# Patient Record
Sex: MALE | Race: WHITE | Employment: OTHER | URBAN - METROPOLITAN AREA
[De-identification: names, ages, dates, MRNs, and addresses within clinical notes are randomized per-mention and may not be internally consistent; named-entity substitution may affect disease eponyms.]

---

## 2018-01-01 ENCOUNTER — APPOINTMENT (OUTPATIENT)
Dept: GENERAL RADIOLOGY | Age: 58
DRG: 136 | End: 2018-01-01
Attending: INTERNAL MEDICINE
Payer: MEDICAID

## 2018-01-01 ENCOUNTER — HOSPITAL ENCOUNTER (EMERGENCY)
Age: 58
Discharge: HOME | End: 2018-10-24
Attending: EMERGENCY MEDICINE
Payer: MEDICAID

## 2018-01-01 ENCOUNTER — APPOINTMENT (OUTPATIENT)
Dept: GENERAL RADIOLOGY | Age: 58
End: 2018-01-01
Payer: MEDICAID

## 2018-01-01 ENCOUNTER — CLINICAL DOCUMENTATION (OUTPATIENT)
Dept: RADIATION ONCOLOGY | Age: 58
End: 2018-01-01

## 2018-01-01 ENCOUNTER — HOSPITAL ENCOUNTER (INPATIENT)
Age: 58
LOS: 7 days | DRG: 136 | End: 2018-12-13
Attending: INTERNAL MEDICINE | Admitting: INTERNAL MEDICINE
Payer: MEDICAID

## 2018-01-01 ENCOUNTER — APPOINTMENT (OUTPATIENT)
Dept: CT IMAGING | Age: 58
DRG: 136 | End: 2018-01-01
Attending: INTERNAL MEDICINE
Payer: MEDICAID

## 2018-01-01 ENCOUNTER — APPOINTMENT (OUTPATIENT)
Dept: CT IMAGING | Age: 58
End: 2018-01-01
Payer: MEDICAID

## 2018-01-01 ENCOUNTER — HOSPITAL ENCOUNTER (EMERGENCY)
Age: 58
Discharge: OTHER INSTITUTION | End: 2018-12-05
Attending: EMERGENCY MEDICINE | Admitting: INTERNAL MEDICINE
Payer: MEDICAID

## 2018-01-01 VITALS
HEIGHT: 73 IN | TEMPERATURE: 98 F | HEART RATE: 108 BPM | DIASTOLIC BLOOD PRESSURE: 76 MMHG | SYSTOLIC BLOOD PRESSURE: 148 MMHG | OXYGEN SATURATION: 95 % | BODY MASS INDEX: 23.86 KG/M2 | WEIGHT: 180 LBS | RESPIRATION RATE: 18 BRPM

## 2018-01-01 VITALS
HEIGHT: 73 IN | SYSTOLIC BLOOD PRESSURE: 126 MMHG | WEIGHT: 169.53 LBS | RESPIRATION RATE: 36 BRPM | DIASTOLIC BLOOD PRESSURE: 87 MMHG | TEMPERATURE: 99 F | BODY MASS INDEX: 22.47 KG/M2 | HEART RATE: 157 BPM | OXYGEN SATURATION: 65 %

## 2018-01-01 VITALS
RESPIRATION RATE: 16 BRPM | SYSTOLIC BLOOD PRESSURE: 108 MMHG | TEMPERATURE: 98.1 F | BODY MASS INDEX: 21.2 KG/M2 | HEART RATE: 91 BPM | OXYGEN SATURATION: 99 % | DIASTOLIC BLOOD PRESSURE: 70 MMHG | HEIGHT: 73 IN | WEIGHT: 160 LBS

## 2018-01-01 DIAGNOSIS — I47.20 V TACH: ICD-10-CM

## 2018-01-01 DIAGNOSIS — C34.90 PRIMARY MALIGNANT NEOPLASM OF LUNG METASTATIC TO OTHER SITE, UNSPECIFIED LATERALITY (HCC): ICD-10-CM

## 2018-01-01 DIAGNOSIS — R53.1 GENERAL WEAKNESS: ICD-10-CM

## 2018-01-01 DIAGNOSIS — R53.83 FATIGUE, UNSPECIFIED TYPE: Primary | ICD-10-CM

## 2018-01-01 DIAGNOSIS — J18.9 PNEUMONIA DUE TO ORGANISM: ICD-10-CM

## 2018-01-01 DIAGNOSIS — J18.9 PNEUMONIA, UNSPECIFIED ORGANISM: ICD-10-CM

## 2018-01-01 DIAGNOSIS — C34.90 MALIGNANT NEOPLASM OF LUNG, UNSPECIFIED LATERALITY, UNSPECIFIED PART OF LUNG (HCC): Primary | ICD-10-CM

## 2018-01-01 DIAGNOSIS — R09.02 HYPOXIA: ICD-10-CM

## 2018-01-01 LAB
A/G RATIO: 1 (ref 1.1–2.2)
A/G RATIO: 1.2 (ref 1.1–2.2)
ALBUMIN SERPL-MCNC: 3.9 G/DL (ref 3.4–5)
ALBUMIN SERPL-MCNC: 4.3 G/DL (ref 3.4–5)
ALP BLD-CCNC: 105 U/L (ref 40–129)
ALP BLD-CCNC: 90 U/L (ref 40–129)
ALT SERPL-CCNC: 19 U/L (ref 10–40)
ALT SERPL-CCNC: 25 U/L (ref 10–40)
ANION GAP SERPL CALCULATED.3IONS-SCNC: 10 MMOL/L (ref 3–16)
ANION GAP SERPL CALCULATED.3IONS-SCNC: 10 MMOL/L (ref 3–16)
ANION GAP SERPL CALCULATED.3IONS-SCNC: 12 MMOL/L (ref 3–16)
ANION GAP SERPL CALCULATED.3IONS-SCNC: 13 MMOL/L (ref 3–16)
ANION GAP SERPL CALCULATED.3IONS-SCNC: 16 MMOL/L (ref 3–16)
ANION GAP SERPL CALCULATED.3IONS-SCNC: 18 MMOL/L (ref 3–16)
ANION GAP SERPL CALCULATED.3IONS-SCNC: 9 MMOL/L (ref 3–16)
APPEARANCE BAL (LAVAGE): ABNORMAL
APTT: 31.3 SEC (ref 26–36)
AST SERPL-CCNC: 26 U/L (ref 15–37)
AST SERPL-CCNC: 38 U/L (ref 15–37)
BASE EXCESS ARTERIAL: -0.1 MMOL/L (ref -3–3)
BASE EXCESS ARTERIAL: -0.2 MMOL/L (ref -3–3)
BASE EXCESS ARTERIAL: -3.8 MMOL/L (ref -3–3)
BASE EXCESS ARTERIAL: -4.2 MMOL/L (ref -3–3)
BASE EXCESS ARTERIAL: -4.5 MMOL/L (ref -3–3)
BASE EXCESS ARTERIAL: -4.7 MMOL/L (ref -3–3)
BASOPHILS ABSOLUTE: 0 K/UL (ref 0–0.2)
BASOPHILS ABSOLUTE: 0.1 K/UL (ref 0–0.2)
BASOPHILS RELATIVE PERCENT: 0 %
BASOPHILS RELATIVE PERCENT: 0.1 %
BASOPHILS RELATIVE PERCENT: 0.2 %
BASOPHILS RELATIVE PERCENT: 0.4 %
BASOPHILS RELATIVE PERCENT: 0.6 %
BILIRUB SERPL-MCNC: 0.4 MG/DL (ref 0–1)
BILIRUB SERPL-MCNC: 0.4 MG/DL (ref 0–1)
BLOOD CULTURE, ROUTINE: NORMAL
BUN BLDV-MCNC: 15 MG/DL (ref 7–20)
BUN BLDV-MCNC: 33 MG/DL (ref 7–20)
BUN BLDV-MCNC: 33 MG/DL (ref 7–20)
BUN BLDV-MCNC: 35 MG/DL (ref 7–20)
BUN BLDV-MCNC: 49 MG/DL (ref 7–20)
BUN BLDV-MCNC: 53 MG/DL (ref 7–20)
BUN BLDV-MCNC: 55 MG/DL (ref 7–20)
CALCIUM SERPL-MCNC: 10 MG/DL (ref 8.3–10.6)
CALCIUM SERPL-MCNC: 10 MG/DL (ref 8.3–10.6)
CALCIUM SERPL-MCNC: 10.1 MG/DL (ref 8.3–10.6)
CALCIUM SERPL-MCNC: 12.1 MG/DL (ref 8.3–10.6)
CALCIUM SERPL-MCNC: 9 MG/DL (ref 8.3–10.6)
CALCIUM SERPL-MCNC: 9 MG/DL (ref 8.3–10.6)
CALCIUM SERPL-MCNC: 9.3 MG/DL (ref 8.3–10.6)
CARBOXYHEMOGLOBIN ARTERIAL: 0.5 % (ref 0–1.5)
CARBOXYHEMOGLOBIN ARTERIAL: 0.8 % (ref 0–1.5)
CARBOXYHEMOGLOBIN ARTERIAL: 0.8 % (ref 0–1.5)
CARBOXYHEMOGLOBIN ARTERIAL: 0.9 % (ref 0–1.5)
CARBOXYHEMOGLOBIN ARTERIAL: 1.2 % (ref 0–1.5)
CARBOXYHEMOGLOBIN ARTERIAL: 1.5 % (ref 0–1.5)
CHLORIDE BLD-SCNC: 102 MMOL/L (ref 99–110)
CHLORIDE BLD-SCNC: 106 MMOL/L (ref 99–110)
CHLORIDE BLD-SCNC: 108 MMOL/L (ref 99–110)
CHLORIDE BLD-SCNC: 93 MMOL/L (ref 99–110)
CHLORIDE BLD-SCNC: 95 MMOL/L (ref 99–110)
CHLORIDE BLD-SCNC: 97 MMOL/L (ref 99–110)
CHLORIDE BLD-SCNC: 99 MMOL/L (ref 99–110)
CLOT EVALUATION BAL: ABNORMAL
CO2: 21 MMOL/L (ref 21–32)
CO2: 21 MMOL/L (ref 21–32)
CO2: 23 MMOL/L (ref 21–32)
CO2: 25 MMOL/L (ref 21–32)
CO2: 26 MMOL/L (ref 21–32)
CO2: 27 MMOL/L (ref 21–32)
CO2: 28 MMOL/L (ref 21–32)
COLOR LAVAGE: COLORLESS
CREAT SERPL-MCNC: 0.7 MG/DL (ref 0.8–1.3)
CREAT SERPL-MCNC: 0.8 MG/DL (ref 0.8–1.3)
CREAT SERPL-MCNC: 1 MG/DL (ref 0.8–1.3)
CREAT SERPL-MCNC: 1 MG/DL (ref 0.8–1.3)
CULTURE, BLOOD 2: NORMAL
CULTURE, RESPIRATORY: NORMAL
CULTURE, RESPIRATORY: NORMAL
EKG ATRIAL RATE: 101 BPM
EKG ATRIAL RATE: 116 BPM
EKG ATRIAL RATE: 121 BPM
EKG ATRIAL RATE: 174 BPM
EKG DIAGNOSIS: NORMAL
EKG P AXIS: 73 DEGREES
EKG P AXIS: 73 DEGREES
EKG P-R INTERVAL: 136 MS
EKG P-R INTERVAL: 136 MS
EKG Q-T INTERVAL: 262 MS
EKG Q-T INTERVAL: 304 MS
EKG Q-T INTERVAL: 306 MS
EKG Q-T INTERVAL: 314 MS
EKG QRS DURATION: 70 MS
EKG QRS DURATION: 74 MS
EKG QRS DURATION: 80 MS
EKG QRS DURATION: 90 MS
EKG QTC CALCULATION (BAZETT): 418 MS
EKG QTC CALCULATION (BAZETT): 431 MS
EKG QTC CALCULATION (BAZETT): 436 MS
EKG QTC CALCULATION (BAZETT): 500 MS
EKG R AXIS: 10 DEGREES
EKG R AXIS: 46 DEGREES
EKG R AXIS: 6 DEGREES
EKG R AXIS: 73 DEGREES
EKG T AXIS: 107 DEGREES
EKG T AXIS: 51 DEGREES
EKG T AXIS: 71 DEGREES
EKG T AXIS: 72 DEGREES
EKG VENTRICULAR RATE: 116 BPM
EKG VENTRICULAR RATE: 121 BPM
EKG VENTRICULAR RATE: 153 BPM
EKG VENTRICULAR RATE: 161 BPM
EOSINOPHILS ABSOLUTE: 0 K/UL (ref 0–0.6)
EOSINOPHILS ABSOLUTE: 0.2 K/UL (ref 0–0.6)
EOSINOPHILS RELATIVE PERCENT: 0 %
EOSINOPHILS RELATIVE PERCENT: 0.5 %
EOSINOPHILS RELATIVE PERCENT: 2 %
FINAL REPORT: NORMAL
GFR AFRICAN AMERICAN: >60
GFR NON-AFRICAN AMERICAN: >60
GLOBULIN: 3.7 G/DL
GLOBULIN: 4 G/DL
GLUCOSE BLD-MCNC: 106 MG/DL (ref 70–99)
GLUCOSE BLD-MCNC: 120 MG/DL (ref 70–99)
GLUCOSE BLD-MCNC: 124 MG/DL (ref 70–99)
GLUCOSE BLD-MCNC: 126 MG/DL (ref 70–99)
GLUCOSE BLD-MCNC: 127 MG/DL (ref 70–99)
GLUCOSE BLD-MCNC: 135 MG/DL (ref 70–99)
GLUCOSE BLD-MCNC: 143 MG/DL (ref 70–99)
GLUCOSE BLD-MCNC: 94 MG/DL (ref 70–99)
GRAM STAIN RESULT: NORMAL
GRAM STAIN RESULT: NORMAL
HCO3 ARTERIAL: 22.4 MMOL/L (ref 21–29)
HCO3 ARTERIAL: 22.8 MMOL/L (ref 21–29)
HCO3 ARTERIAL: 23.4 MMOL/L (ref 21–29)
HCO3 ARTERIAL: 23.6 MMOL/L (ref 21–29)
HCO3 ARTERIAL: 24.3 MMOL/L (ref 21–29)
HCO3 ARTERIAL: 25.7 MMOL/L (ref 21–29)
HCT VFR BLD CALC: 35 % (ref 40.5–52.5)
HCT VFR BLD CALC: 36.2 % (ref 40.5–52.5)
HCT VFR BLD CALC: 37.5 % (ref 40.5–52.5)
HCT VFR BLD CALC: 39.4 % (ref 40.5–52.5)
HCT VFR BLD CALC: 44.1 % (ref 40.5–52.5)
HEMOGLOBIN, ART, EXTENDED: 11.6 G/DL (ref 13.5–17.5)
HEMOGLOBIN, ART, EXTENDED: 12 G/DL (ref 13.5–17.5)
HEMOGLOBIN, ART, EXTENDED: 12.3 G/DL (ref 13.5–17.5)
HEMOGLOBIN, ART, EXTENDED: 12.4 G/DL (ref 13.5–17.5)
HEMOGLOBIN, ART, EXTENDED: 12.4 G/DL (ref 13.5–17.5)
HEMOGLOBIN, ART, EXTENDED: 12.6 G/DL (ref 13.5–17.5)
HEMOGLOBIN: 11.4 G/DL (ref 13.5–17.5)
HEMOGLOBIN: 12 G/DL (ref 13.5–17.5)
HEMOGLOBIN: 12.2 G/DL (ref 13.5–17.5)
HEMOGLOBIN: 13 G/DL (ref 13.5–17.5)
HEMOGLOBIN: 14.3 G/DL (ref 13.5–17.5)
INR BLD: 1.04 (ref 0.86–1.14)
LACTIC ACID, SEPSIS: 0.9 MMOL/L (ref 0.4–1.9)
LACTIC ACID: 2 MMOL/L (ref 0.4–2)
LV EF: 55 %
LVEF MODALITY: NORMAL
LYMPHOCYTES ABSOLUTE: 0.4 K/UL (ref 1–5.1)
LYMPHOCYTES ABSOLUTE: 0.6 K/UL (ref 1–5.1)
LYMPHOCYTES ABSOLUTE: 0.8 K/UL (ref 1–5.1)
LYMPHOCYTES ABSOLUTE: 1.3 K/UL (ref 1–5.1)
LYMPHOCYTES ABSOLUTE: 1.3 K/UL (ref 1–5.1)
LYMPHOCYTES RELATIVE PERCENT: 12.3 %
LYMPHOCYTES RELATIVE PERCENT: 15.4 %
LYMPHOCYTES RELATIVE PERCENT: 16.8 %
LYMPHOCYTES RELATIVE PERCENT: 4.9 %
LYMPHOCYTES RELATIVE PERCENT: 7.5 %
LYMPHOCYTES, BAL: 10 % (ref 5–10)
MAGNESIUM: 2.2 MG/DL (ref 1.8–2.4)
MAGNESIUM: 2.6 MG/DL (ref 1.8–2.4)
MCH RBC QN AUTO: 30.1 PG (ref 26–34)
MCH RBC QN AUTO: 30.5 PG (ref 26–34)
MCH RBC QN AUTO: 30.5 PG (ref 26–34)
MCH RBC QN AUTO: 30.8 PG (ref 26–34)
MCH RBC QN AUTO: 31.1 PG (ref 26–34)
MCHC RBC AUTO-ENTMCNC: 32.4 G/DL (ref 31–36)
MCHC RBC AUTO-ENTMCNC: 32.4 G/DL (ref 31–36)
MCHC RBC AUTO-ENTMCNC: 32.5 G/DL (ref 31–36)
MCHC RBC AUTO-ENTMCNC: 33.1 G/DL (ref 31–36)
MCHC RBC AUTO-ENTMCNC: 33.2 G/DL (ref 31–36)
MCV RBC AUTO: 92 FL (ref 80–100)
MCV RBC AUTO: 92.9 FL (ref 80–100)
MCV RBC AUTO: 94 FL (ref 80–100)
MCV RBC AUTO: 94 FL (ref 80–100)
MCV RBC AUTO: 94.9 FL (ref 80–100)
METHEMOGLOBIN ARTERIAL: 0 %
METHEMOGLOBIN ARTERIAL: 0 %
METHEMOGLOBIN ARTERIAL: 0.2 %
METHEMOGLOBIN ARTERIAL: 0.3 %
MONOCYTES ABSOLUTE: 0.2 K/UL (ref 0–1.3)
MONOCYTES ABSOLUTE: 0.3 K/UL (ref 0–1.3)
MONOCYTES ABSOLUTE: 0.6 K/UL (ref 0–1.3)
MONOCYTES ABSOLUTE: 0.6 K/UL (ref 0–1.3)
MONOCYTES ABSOLUTE: 0.9 K/UL (ref 0–1.3)
MONOCYTES RELATIVE PERCENT: 10.1 %
MONOCYTES RELATIVE PERCENT: 2.4 %
MONOCYTES RELATIVE PERCENT: 3.6 %
MONOCYTES RELATIVE PERCENT: 7.4 %
MONOCYTES RELATIVE PERCENT: 7.5 %
NEUTROPHILS ABSOLUTE: 5.7 K/UL (ref 1.7–7.7)
NEUTROPHILS ABSOLUTE: 5.8 K/UL (ref 1.7–7.7)
NEUTROPHILS ABSOLUTE: 6.2 K/UL (ref 1.7–7.7)
NEUTROPHILS ABSOLUTE: 6.9 K/UL (ref 1.7–7.7)
NEUTROPHILS ABSOLUTE: 7.2 K/UL (ref 1.7–7.7)
NEUTROPHILS RELATIVE PERCENT: 71.9 %
NEUTROPHILS RELATIVE PERCENT: 74.9 %
NEUTROPHILS RELATIVE PERCENT: 84.8 %
NEUTROPHILS RELATIVE PERCENT: 85.2 %
NEUTROPHILS RELATIVE PERCENT: 91.5 %
NUMBER OF CELLS COUNTED BAL (LAVAGE): 200
O2 CONTENT ARTERIAL: 15 ML/DL
O2 CONTENT ARTERIAL: 16 ML/DL
O2 CONTENT ARTERIAL: 18 ML/DL
O2 SAT, ARTERIAL: 89.4 %
O2 SAT, ARTERIAL: 90.8 %
O2 SAT, ARTERIAL: 90.9 %
O2 SAT, ARTERIAL: 95 %
O2 SAT, ARTERIAL: 95.7 %
O2 SAT, ARTERIAL: 98.3 %
O2 THERAPY: ABNORMAL
PCO2 ARTERIAL: 38.6 MMHG (ref 35–45)
PCO2 ARTERIAL: 45.1 MMHG (ref 35–45)
PCO2 ARTERIAL: 47.6 MMHG (ref 35–45)
PCO2 ARTERIAL: 49.9 MMHG (ref 35–45)
PCO2 ARTERIAL: 56.7 MMHG (ref 35–45)
PCO2 ARTERIAL: 57.7 MMHG (ref 35–45)
PDW BLD-RTO: 13.2 % (ref 12.4–15.4)
PDW BLD-RTO: 14.7 % (ref 12.4–15.4)
PDW BLD-RTO: 14.9 % (ref 12.4–15.4)
PDW BLD-RTO: 15.1 % (ref 12.4–15.4)
PDW BLD-RTO: 15.4 % (ref 12.4–15.4)
PERFORMED ON: NORMAL
PH ARTERIAL: 7.23 (ref 7.35–7.45)
PH ARTERIAL: 7.23 (ref 7.35–7.45)
PH ARTERIAL: 7.28 (ref 7.35–7.45)
PH ARTERIAL: 7.31 (ref 7.35–7.45)
PH ARTERIAL: 7.35 (ref 7.35–7.45)
PH ARTERIAL: 7.42 (ref 7.35–7.45)
PLATELET # BLD: 244 K/UL (ref 135–450)
PLATELET # BLD: 285 K/UL (ref 135–450)
PLATELET # BLD: 348 K/UL (ref 135–450)
PLATELET # BLD: 422 K/UL (ref 135–450)
PLATELET # BLD: 430 K/UL (ref 135–450)
PMV BLD AUTO: 7.4 FL (ref 5–10.5)
PMV BLD AUTO: 7.6 FL (ref 5–10.5)
PMV BLD AUTO: 8 FL (ref 5–10.5)
PMV BLD AUTO: 8.1 FL (ref 5–10.5)
PMV BLD AUTO: 8.2 FL (ref 5–10.5)
PO2 ARTERIAL: 144 MMHG (ref 75–108)
PO2 ARTERIAL: 65.7 MMHG (ref 75–108)
PO2 ARTERIAL: 68.1 MMHG (ref 75–108)
PO2 ARTERIAL: 68.2 MMHG (ref 75–108)
PO2 ARTERIAL: 85.4 MMHG (ref 75–108)
PO2 ARTERIAL: 86.2 MMHG (ref 75–108)
POTASSIUM REFLEX MAGNESIUM: 4 MMOL/L (ref 3.5–5.1)
POTASSIUM REFLEX MAGNESIUM: 4.1 MMOL/L (ref 3.5–5.1)
POTASSIUM REFLEX MAGNESIUM: 4.4 MMOL/L (ref 3.5–5.1)
POTASSIUM REFLEX MAGNESIUM: 4.4 MMOL/L (ref 3.5–5.1)
POTASSIUM REFLEX MAGNESIUM: 4.9 MMOL/L (ref 3.5–5.1)
POTASSIUM SERPL-SCNC: 4.3 MMOL/L (ref 3.5–5.1)
POTASSIUM SERPL-SCNC: 4.6 MMOL/L (ref 3.5–5.1)
PRELIMINARY: NORMAL
PRO-BNP: 669 PG/ML (ref 0–124)
PRO-BNP: 957 PG/ML (ref 0–124)
PROTHROMBIN TIME: 11.8 SEC (ref 9.8–13)
RAPID INFLUENZA  B AGN: NEGATIVE
RAPID INFLUENZA A AGN: NEGATIVE
RBC # BLD: 3.73 M/UL (ref 4.2–5.9)
RBC # BLD: 3.93 M/UL (ref 4.2–5.9)
RBC # BLD: 3.96 M/UL (ref 4.2–5.9)
RBC # BLD: 4.19 M/UL (ref 4.2–5.9)
RBC # BLD: 4.75 M/UL (ref 4.2–5.9)
RBC, BAL: 333 /CUMM
SEGMENTED NEUTROPHILS, BAL: 90 % (ref 5–10)
SODIUM BLD-SCNC: 134 MMOL/L (ref 136–145)
SODIUM BLD-SCNC: 135 MMOL/L (ref 136–145)
SODIUM BLD-SCNC: 136 MMOL/L (ref 136–145)
SODIUM BLD-SCNC: 136 MMOL/L (ref 136–145)
SODIUM BLD-SCNC: 137 MMOL/L (ref 136–145)
SODIUM BLD-SCNC: 139 MMOL/L (ref 136–145)
SODIUM BLD-SCNC: 142 MMOL/L (ref 136–145)
TCO2 ARTERIAL: 23.8 MMOL/L
TCO2 ARTERIAL: 24.3 MMOL/L
TCO2 ARTERIAL: 25.1 MMOL/L
TCO2 ARTERIAL: 25.4 MMOL/L
TCO2 ARTERIAL: 25.5 MMOL/L
TCO2 ARTERIAL: 27.2 MMOL/L
TOTAL PROTEIN: 7.9 G/DL (ref 6.4–8.2)
TOTAL PROTEIN: 8 G/DL (ref 6.4–8.2)
TRIGL SERPL-MCNC: 221 MG/DL (ref 0–150)
TROPONIN: 0.03 NG/ML
TROPONIN: 0.03 NG/ML
TROPONIN: 0.05 NG/ML
TROPONIN: <0.01 NG/ML
TSH SERPL DL<=0.05 MIU/L-ACNC: 1.42 UIU/ML (ref 0.27–4.2)
WBC # BLD: 6.9 K/UL (ref 4–11)
WBC # BLD: 7.7 K/UL (ref 4–11)
WBC # BLD: 7.8 K/UL (ref 4–11)
WBC # BLD: 8.2 K/UL (ref 4–11)
WBC # BLD: 8.7 K/UL (ref 4–11)
WBC/EPI CELLS BAL: 2222 /CUMM

## 2018-01-01 PROCEDURE — 71275 CT ANGIOGRAPHY CHEST: CPT

## 2018-01-01 PROCEDURE — C9113 INJ PANTOPRAZOLE SODIUM, VIA: HCPCS | Performed by: INTERNAL MEDICINE

## 2018-01-01 PROCEDURE — 71250 CT THORAX DX C-: CPT

## 2018-01-01 PROCEDURE — 94002 VENT MGMT INPAT INIT DAY: CPT

## 2018-01-01 PROCEDURE — 96375 TX/PRO/DX INJ NEW DRUG ADDON: CPT

## 2018-01-01 PROCEDURE — 94003 VENT MGMT INPAT SUBQ DAY: CPT

## 2018-01-01 PROCEDURE — 6370000000 HC RX 637 (ALT 250 FOR IP): Performed by: INTERNAL MEDICINE

## 2018-01-01 PROCEDURE — 94660 CPAP INITIATION&MGMT: CPT

## 2018-01-01 PROCEDURE — 2580000003 HC RX 258: Performed by: EMERGENCY MEDICINE

## 2018-01-01 PROCEDURE — 6360000002 HC RX W HCPCS: Performed by: INTERNAL MEDICINE

## 2018-01-01 PROCEDURE — 94667 MNPJ CHEST WALL 1ST: CPT

## 2018-01-01 PROCEDURE — 94668 MNPJ CHEST WALL SBSQ: CPT

## 2018-01-01 PROCEDURE — 2700000000 HC OXYGEN THERAPY PER DAY

## 2018-01-01 PROCEDURE — 99285 EMERGENCY DEPT VISIT HI MDM: CPT

## 2018-01-01 PROCEDURE — 74018 RADEX ABDOMEN 1 VIEW: CPT

## 2018-01-01 PROCEDURE — 6360000002 HC RX W HCPCS: Performed by: PHYSICIAN ASSISTANT

## 2018-01-01 PROCEDURE — 94770 HC ETCO2 MONITOR DAILY: CPT

## 2018-01-01 PROCEDURE — 82803 BLOOD GASES ANY COMBINATION: CPT

## 2018-01-01 PROCEDURE — 2000000000 HC ICU R&B

## 2018-01-01 PROCEDURE — 2580000003 HC RX 258: Performed by: INTERNAL MEDICINE

## 2018-01-01 PROCEDURE — 2580000003 HC RX 258: Performed by: PHYSICIAN ASSISTANT

## 2018-01-01 PROCEDURE — 93005 ELECTROCARDIOGRAM TRACING: CPT | Performed by: EMERGENCY MEDICINE

## 2018-01-01 PROCEDURE — 89051 BODY FLUID CELL COUNT: CPT

## 2018-01-01 PROCEDURE — 87070 CULTURE OTHR SPECIMN AEROBIC: CPT

## 2018-01-01 PROCEDURE — 71045 X-RAY EXAM CHEST 1 VIEW: CPT

## 2018-01-01 PROCEDURE — 36415 COLL VENOUS BLD VENIPUNCTURE: CPT

## 2018-01-01 PROCEDURE — 84443 ASSAY THYROID STIM HORMONE: CPT

## 2018-01-01 PROCEDURE — 6360000002 HC RX W HCPCS: Performed by: EMERGENCY MEDICINE

## 2018-01-01 PROCEDURE — 93010 ELECTROCARDIOGRAM REPORT: CPT | Performed by: INTERNAL MEDICINE

## 2018-01-01 PROCEDURE — 6360000004 HC RX CONTRAST MEDICATION: Performed by: PHYSICIAN ASSISTANT

## 2018-01-01 PROCEDURE — 83880 ASSAY OF NATRIURETIC PEPTIDE: CPT

## 2018-01-01 PROCEDURE — 36600 WITHDRAWAL OF ARTERIAL BLOOD: CPT

## 2018-01-01 PROCEDURE — 94761 N-INVAS EAR/PLS OXIMETRY MLT: CPT

## 2018-01-01 PROCEDURE — 84478 ASSAY OF TRIGLYCERIDES: CPT

## 2018-01-01 PROCEDURE — 2709999900 HC NON-CHARGEABLE SUPPLY: Performed by: INTERNAL MEDICINE

## 2018-01-01 PROCEDURE — 94750 HC PULMONARY COMPLIANCE STUDY: CPT

## 2018-01-01 PROCEDURE — 94640 AIRWAY INHALATION TREATMENT: CPT

## 2018-01-01 PROCEDURE — 1200000000 HC SEMI PRIVATE

## 2018-01-01 PROCEDURE — 87040 BLOOD CULTURE FOR BACTERIA: CPT

## 2018-01-01 PROCEDURE — 3609010800 HC BRONCHOSCOPY ALVEOLAR LAVAGE: Performed by: INTERNAL MEDICINE

## 2018-01-01 PROCEDURE — 85025 COMPLETE CBC W/AUTO DIFF WBC: CPT

## 2018-01-01 PROCEDURE — 0BC68ZZ EXTIRPATION OF MATTER FROM RIGHT LOWER LOBE BRONCHUS, VIA NATURAL OR ARTIFICIAL OPENING ENDOSCOPIC: ICD-10-PCS | Performed by: INTERNAL MEDICINE

## 2018-01-01 PROCEDURE — 3609010900 HC BRONCHOSCOPY THERAPUTIC ASPIRATION INITIAL: Performed by: INTERNAL MEDICINE

## 2018-01-01 PROCEDURE — 87116 MYCOBACTERIA CULTURE: CPT

## 2018-01-01 PROCEDURE — 96365 THER/PROPH/DIAG IV INF INIT: CPT

## 2018-01-01 PROCEDURE — 88341 IMHCHEM/IMCYTCHM EA ADD ANTB: CPT

## 2018-01-01 PROCEDURE — 3609011300 HC BRONCHOSCOPY BRONCHIAL/ENDOBRNCL BX 1+ SITES: Performed by: INTERNAL MEDICINE

## 2018-01-01 PROCEDURE — 83605 ASSAY OF LACTIC ACID: CPT

## 2018-01-01 PROCEDURE — 31500 INSERT EMERGENCY AIRWAY: CPT

## 2018-01-01 PROCEDURE — 84484 ASSAY OF TROPONIN QUANT: CPT

## 2018-01-01 PROCEDURE — 31623 DX BRONCHOSCOPE/BRUSH: CPT | Performed by: INTERNAL MEDICINE

## 2018-01-01 PROCEDURE — 88112 CYTOPATH CELL ENHANCE TECH: CPT

## 2018-01-01 PROCEDURE — 83735 ASSAY OF MAGNESIUM: CPT

## 2018-01-01 PROCEDURE — 31625 BRONCHOSCOPY W/BIOPSY(S): CPT | Performed by: INTERNAL MEDICINE

## 2018-01-01 PROCEDURE — 74176 CT ABD & PELVIS W/O CONTRAST: CPT

## 2018-01-01 PROCEDURE — 80048 BASIC METABOLIC PNL TOTAL CA: CPT

## 2018-01-01 PROCEDURE — 51702 INSERT TEMP BLADDER CATH: CPT

## 2018-01-01 PROCEDURE — 2060000000 HC ICU INTERMEDIATE R&B

## 2018-01-01 PROCEDURE — 87206 SMEAR FLUORESCENT/ACID STAI: CPT

## 2018-01-01 PROCEDURE — 99291 CRITICAL CARE FIRST HOUR: CPT | Performed by: NURSE PRACTITIONER

## 2018-01-01 PROCEDURE — 6360000002 HC RX W HCPCS

## 2018-01-01 PROCEDURE — 0B9J8ZX DRAINAGE OF LEFT LOWER LUNG LOBE, VIA NATURAL OR ARTIFICIAL OPENING ENDOSCOPIC, DIAGNOSTIC: ICD-10-PCS | Performed by: INTERNAL MEDICINE

## 2018-01-01 PROCEDURE — 88360 TUMOR IMMUNOHISTOCHEM/MANUAL: CPT

## 2018-01-01 PROCEDURE — 85730 THROMBOPLASTIN TIME PARTIAL: CPT

## 2018-01-01 PROCEDURE — 87804 INFLUENZA ASSAY W/OPTIC: CPT

## 2018-01-01 PROCEDURE — 87254 VIRUS INOCULATION SHELL VIA: CPT

## 2018-01-01 PROCEDURE — 2500000003 HC RX 250 WO HCPCS: Performed by: INTERNAL MEDICINE

## 2018-01-01 PROCEDURE — 87015 SPECIMEN INFECT AGNT CONCNTJ: CPT

## 2018-01-01 PROCEDURE — 0BH18EZ INSERTION OF ENDOTRACHEAL AIRWAY INTO TRACHEA, VIA NATURAL OR ARTIFICIAL OPENING ENDOSCOPIC: ICD-10-PCS | Performed by: INTERNAL MEDICINE

## 2018-01-01 PROCEDURE — 99291 CRITICAL CARE FIRST HOUR: CPT | Performed by: INTERNAL MEDICINE

## 2018-01-01 PROCEDURE — 93005 ELECTROCARDIOGRAM TRACING: CPT | Performed by: INTERNAL MEDICINE

## 2018-01-01 PROCEDURE — 88342 IMHCHEM/IMCYTCHM 1ST ANTB: CPT

## 2018-01-01 PROCEDURE — 93005 ELECTROCARDIOGRAM TRACING: CPT | Performed by: PHYSICIAN ASSISTANT

## 2018-01-01 PROCEDURE — 99406 BEHAV CHNG SMOKING 3-10 MIN: CPT

## 2018-01-01 PROCEDURE — 88305 TISSUE EXAM BY PATHOLOGIST: CPT

## 2018-01-01 PROCEDURE — 5A1945Z RESPIRATORY VENTILATION, 24-96 CONSECUTIVE HOURS: ICD-10-PCS | Performed by: INTERNAL MEDICINE

## 2018-01-01 PROCEDURE — 93005 ELECTROCARDIOGRAM TRACING: CPT | Performed by: NURSE PRACTITIONER

## 2018-01-01 PROCEDURE — 87205 SMEAR GRAM STAIN: CPT

## 2018-01-01 PROCEDURE — 87106 FUNGI IDENTIFICATION YEAST: CPT

## 2018-01-01 PROCEDURE — 0BC48ZZ EXTIRPATION OF MATTER FROM RIGHT UPPER LOBE BRONCHUS, VIA NATURAL OR ARTIFICIAL OPENING ENDOSCOPIC: ICD-10-PCS | Performed by: INTERNAL MEDICINE

## 2018-01-01 PROCEDURE — 2500000003 HC RX 250 WO HCPCS: Performed by: NURSE PRACTITIONER

## 2018-01-01 PROCEDURE — 87102 FUNGUS ISOLATION CULTURE: CPT

## 2018-01-01 PROCEDURE — 99233 SBSQ HOSP IP/OBS HIGH 50: CPT | Performed by: INTERNAL MEDICINE

## 2018-01-01 PROCEDURE — 80053 COMPREHEN METABOLIC PANEL: CPT

## 2018-01-01 PROCEDURE — 3609011100 HC BRONCHOSCOPY BRUSHINGS: Performed by: INTERNAL MEDICINE

## 2018-01-01 PROCEDURE — 85610 PROTHROMBIN TIME: CPT

## 2018-01-01 PROCEDURE — 31645 BRNCHSC W/THER ASPIR 1ST: CPT | Performed by: INTERNAL MEDICINE

## 2018-01-01 PROCEDURE — 0BDB8ZX EXTRACTION OF LEFT LOWER LOBE BRONCHUS, VIA NATURAL OR ARTIFICIAL OPENING ENDOSCOPIC, DIAGNOSTIC: ICD-10-PCS | Performed by: INTERNAL MEDICINE

## 2018-01-01 PROCEDURE — 87252 VIRUS INOCULATION TISSUE: CPT

## 2018-01-01 PROCEDURE — 93306 TTE W/DOPPLER COMPLETE: CPT

## 2018-01-01 PROCEDURE — 96366 THER/PROPH/DIAG IV INF ADDON: CPT

## 2018-01-01 PROCEDURE — 31624 DX BRONCHOSCOPE/LAVAGE: CPT | Performed by: INTERNAL MEDICINE

## 2018-01-01 PROCEDURE — 2580000003 HC RX 258: Performed by: NURSE PRACTITIONER

## 2018-01-01 PROCEDURE — 51798 US URINE CAPACITY MEASURE: CPT

## 2018-01-01 PROCEDURE — 6370000000 HC RX 637 (ALT 250 FOR IP): Performed by: PHYSICIAN ASSISTANT

## 2018-01-01 PROCEDURE — 94664 DEMO&/EVAL PT USE INHALER: CPT

## 2018-01-01 PROCEDURE — 31500 INSERT EMERGENCY AIRWAY: CPT | Performed by: INTERNAL MEDICINE

## 2018-01-01 PROCEDURE — 99254 IP/OBS CNSLTJ NEW/EST MOD 60: CPT | Performed by: INTERNAL MEDICINE

## 2018-01-01 RX ORDER — MORPHINE SULFATE 4 MG/ML
4 INJECTION, SOLUTION INTRAMUSCULAR; INTRAVENOUS ONCE
Status: COMPLETED | OUTPATIENT
Start: 2018-01-01 | End: 2018-01-01

## 2018-01-01 RX ORDER — METHYLPREDNISOLONE SODIUM SUCCINATE 125 MG/2ML
125 INJECTION, POWDER, LYOPHILIZED, FOR SOLUTION INTRAMUSCULAR; INTRAVENOUS ONCE
Status: COMPLETED | OUTPATIENT
Start: 2018-01-01 | End: 2018-01-01

## 2018-01-01 RX ORDER — IPRATROPIUM BROMIDE AND ALBUTEROL SULFATE 2.5; .5 MG/3ML; MG/3ML
1 SOLUTION RESPIRATORY (INHALATION) EVERY 4 HOURS
Status: DISCONTINUED | OUTPATIENT
Start: 2018-01-01 | End: 2018-01-01

## 2018-01-01 RX ORDER — PROPOFOL 10 MG/ML
20 INJECTION, EMULSION INTRAVENOUS
Status: DISCONTINUED | OUTPATIENT
Start: 2018-01-01 | End: 2018-01-01

## 2018-01-01 RX ORDER — SODIUM CHLORIDE 9 MG/ML
INJECTION, SOLUTION INTRAVENOUS CONTINUOUS
Status: DISCONTINUED | OUTPATIENT
Start: 2018-01-01 | End: 2018-01-01

## 2018-01-01 RX ORDER — CEFUROXIME AXETIL 500 MG/1
500 TABLET ORAL 2 TIMES DAILY
Qty: 14 TABLET | Refills: 0 | Status: SHIPPED | OUTPATIENT
Start: 2018-01-01 | End: 2018-01-01

## 2018-01-01 RX ORDER — GUAIFENESIN 600 MG/1
600 TABLET, EXTENDED RELEASE ORAL 2 TIMES DAILY
Status: DISCONTINUED | OUTPATIENT
Start: 2018-01-01 | End: 2018-01-01

## 2018-01-01 RX ORDER — PANTOPRAZOLE SODIUM 40 MG/10ML
40 INJECTION, POWDER, LYOPHILIZED, FOR SOLUTION INTRAVENOUS DAILY
Status: DISCONTINUED | OUTPATIENT
Start: 2018-01-01 | End: 2018-01-01

## 2018-01-01 RX ORDER — DILTIAZEM HYDROCHLORIDE 5 MG/ML
10 INJECTION INTRAVENOUS ONCE
Status: COMPLETED | OUTPATIENT
Start: 2018-01-01 | End: 2018-01-01

## 2018-01-01 RX ORDER — LEVOFLOXACIN 5 MG/ML
750 INJECTION, SOLUTION INTRAVENOUS EVERY 24 HOURS
Status: DISCONTINUED | OUTPATIENT
Start: 2018-01-01 | End: 2018-01-01

## 2018-01-01 RX ORDER — PROPOFOL 10 MG/ML
INJECTION, EMULSION INTRAVENOUS
Status: COMPLETED
Start: 2018-01-01 | End: 2018-01-01

## 2018-01-01 RX ORDER — MORPHINE SULFATE 4 MG/ML
4 INJECTION, SOLUTION INTRAMUSCULAR; INTRAVENOUS
Status: DISCONTINUED | OUTPATIENT
Start: 2018-01-01 | End: 2018-01-01 | Stop reason: HOSPADM

## 2018-01-01 RX ORDER — MORPHINE SULFATE 2 MG/ML
2 INJECTION, SOLUTION INTRAMUSCULAR; INTRAVENOUS
Status: DISCONTINUED | OUTPATIENT
Start: 2018-01-01 | End: 2018-01-01 | Stop reason: HOSPADM

## 2018-01-01 RX ORDER — CHLORHEXIDINE GLUCONATE 0.12 MG/ML
15 RINSE ORAL 2 TIMES DAILY
Status: DISCONTINUED | OUTPATIENT
Start: 2018-01-01 | End: 2018-01-01 | Stop reason: HOSPADM

## 2018-01-01 RX ORDER — METHYLPREDNISOLONE SODIUM SUCCINATE 40 MG/ML
40 INJECTION, POWDER, LYOPHILIZED, FOR SOLUTION INTRAMUSCULAR; INTRAVENOUS EVERY 12 HOURS
Status: DISCONTINUED | OUTPATIENT
Start: 2018-01-01 | End: 2018-01-01

## 2018-01-01 RX ORDER — MAGNESIUM SULFATE 1 G/100ML
1 INJECTION INTRAVENOUS PRN
Status: DISCONTINUED | OUTPATIENT
Start: 2018-01-01 | End: 2018-01-01 | Stop reason: HOSPADM

## 2018-01-01 RX ORDER — 0.9 % SODIUM CHLORIDE 0.9 %
500 INTRAVENOUS SOLUTION INTRAVENOUS ONCE
Status: COMPLETED | OUTPATIENT
Start: 2018-01-01 | End: 2018-01-01

## 2018-01-01 RX ORDER — METOPROLOL TARTRATE 5 MG/5ML
5 INJECTION INTRAVENOUS ONCE
Status: COMPLETED | OUTPATIENT
Start: 2018-01-01 | End: 2018-01-01

## 2018-01-01 RX ORDER — 0.9 % SODIUM CHLORIDE 0.9 %
1000 INTRAVENOUS SOLUTION INTRAVENOUS ONCE
Status: COMPLETED | OUTPATIENT
Start: 2018-01-01 | End: 2018-01-01

## 2018-01-01 RX ORDER — SODIUM CHLORIDE 0.9 % (FLUSH) 0.9 %
10 SYRINGE (ML) INJECTION PRN
Status: DISCONTINUED | OUTPATIENT
Start: 2018-01-01 | End: 2018-01-01 | Stop reason: HOSPADM

## 2018-01-01 RX ORDER — NITROGLYCERIN 0.4 MG/1
0.4 TABLET SUBLINGUAL EVERY 5 MIN PRN
Status: DISCONTINUED | OUTPATIENT
Start: 2018-01-01 | End: 2018-01-01

## 2018-01-01 RX ORDER — DIGOXIN 0.25 MG/ML
125 INJECTION INTRAMUSCULAR; INTRAVENOUS ONCE
Status: COMPLETED | OUTPATIENT
Start: 2018-01-01 | End: 2018-01-01

## 2018-01-01 RX ORDER — IPRATROPIUM BROMIDE AND ALBUTEROL SULFATE 2.5; .5 MG/3ML; MG/3ML
1 SOLUTION RESPIRATORY (INHALATION)
Status: DISCONTINUED | OUTPATIENT
Start: 2018-01-01 | End: 2018-01-01

## 2018-01-01 RX ORDER — NICOTINE 21 MG/24HR
1 PATCH, TRANSDERMAL 24 HOURS TRANSDERMAL DAILY
Status: DISCONTINUED | OUTPATIENT
Start: 2018-01-01 | End: 2018-01-01

## 2018-01-01 RX ORDER — KETOROLAC TROMETHAMINE 30 MG/ML
30 INJECTION, SOLUTION INTRAMUSCULAR; INTRAVENOUS EVERY 6 HOURS PRN
Status: DISCONTINUED | OUTPATIENT
Start: 2018-01-01 | End: 2018-01-01 | Stop reason: HOSPADM

## 2018-01-01 RX ORDER — ATROPINE SULFATE 10 MG/ML
1 SOLUTION/ DROPS OPHTHALMIC EVERY 4 HOURS PRN
Status: DISCONTINUED | OUTPATIENT
Start: 2018-01-01 | End: 2018-01-01 | Stop reason: HOSPADM

## 2018-01-01 RX ORDER — ONDANSETRON 2 MG/ML
4 INJECTION INTRAMUSCULAR; INTRAVENOUS EVERY 6 HOURS PRN
Status: DISCONTINUED | OUTPATIENT
Start: 2018-01-01 | End: 2018-01-01 | Stop reason: HOSPADM

## 2018-01-01 RX ORDER — MAGNESIUM HYDROXIDE 1200 MG/15ML
LIQUID ORAL CONTINUOUS PRN
Status: DISCONTINUED | OUTPATIENT
Start: 2018-01-01 | End: 2018-01-01

## 2018-01-01 RX ORDER — ACETAMINOPHEN, ASPIRIN AND CAFFEINE 250; 250; 65 MG/1; MG/1; MG/1
1 TABLET, FILM COATED ORAL EVERY 6 HOURS PRN
COMMUNITY

## 2018-01-01 RX ORDER — IPRATROPIUM BROMIDE AND ALBUTEROL SULFATE 2.5; .5 MG/3ML; MG/3ML
1 SOLUTION RESPIRATORY (INHALATION) ONCE
Status: COMPLETED | OUTPATIENT
Start: 2018-01-01 | End: 2018-01-01

## 2018-01-01 RX ORDER — ONDANSETRON 2 MG/ML
4 INJECTION INTRAMUSCULAR; INTRAVENOUS EVERY 30 MIN PRN
Status: DISCONTINUED | OUTPATIENT
Start: 2018-01-01 | End: 2018-01-01 | Stop reason: HOSPADM

## 2018-01-01 RX ORDER — AZITHROMYCIN 250 MG/1
TABLET, FILM COATED ORAL
Qty: 1 PACKET | Refills: 0 | Status: SHIPPED | OUTPATIENT
Start: 2018-01-01 | End: 2018-01-01

## 2018-01-01 RX ORDER — SODIUM CHLORIDE 9 MG/ML
INJECTION, SOLUTION INTRAVENOUS
Status: DISPENSED
Start: 2018-01-01 | End: 2018-01-01

## 2018-01-01 RX ORDER — LIDOCAINE HYDROCHLORIDE 20 MG/ML
INJECTION, SOLUTION EPIDURAL; INFILTRATION; INTRACAUDAL; PERINEURAL PRN
Status: DISCONTINUED | OUTPATIENT
Start: 2018-01-01 | End: 2018-01-01

## 2018-01-01 RX ORDER — DIGOXIN 0.25 MG/ML
INJECTION INTRAMUSCULAR; INTRAVENOUS
Status: DISPENSED
Start: 2018-01-01 | End: 2018-01-01

## 2018-01-01 RX ORDER — SODIUM CHLORIDE 0.9 % (FLUSH) 0.9 %
10 SYRINGE (ML) INJECTION EVERY 12 HOURS SCHEDULED
Status: DISCONTINUED | OUTPATIENT
Start: 2018-01-01 | End: 2018-01-01 | Stop reason: HOSPADM

## 2018-01-01 RX ORDER — BUDESONIDE 0.5 MG/2ML
0.5 INHALANT ORAL 2 TIMES DAILY
Status: DISCONTINUED | OUTPATIENT
Start: 2018-01-01 | End: 2018-01-01

## 2018-01-01 RX ORDER — SCOLOPAMINE TRANSDERMAL SYSTEM 1 MG/1
1 PATCH, EXTENDED RELEASE TRANSDERMAL
Status: DISCONTINUED | OUTPATIENT
Start: 2018-01-01 | End: 2018-01-01 | Stop reason: HOSPADM

## 2018-01-01 RX ORDER — CARBOXYMETHYLCELLULOSE SODIUM 10 MG/ML
1 GEL OPHTHALMIC 3 TIMES DAILY
Status: DISCONTINUED | OUTPATIENT
Start: 2018-01-01 | End: 2018-01-01 | Stop reason: HOSPADM

## 2018-01-01 RX ORDER — ACETAMINOPHEN 325 MG/1
650 TABLET ORAL EVERY 4 HOURS PRN
Status: DISCONTINUED | OUTPATIENT
Start: 2018-01-01 | End: 2018-01-01 | Stop reason: HOSPADM

## 2018-01-01 RX ORDER — ACETYLCYSTEINE 200 MG/ML
SOLUTION ORAL; RESPIRATORY (INHALATION) PRN
Status: DISCONTINUED | OUTPATIENT
Start: 2018-01-01 | End: 2018-01-01

## 2018-01-01 RX ORDER — MORPHINE SULFATE 2 MG/ML
2 INJECTION, SOLUTION INTRAMUSCULAR; INTRAVENOUS EVERY 4 HOURS PRN
Status: DISCONTINUED | OUTPATIENT
Start: 2018-01-01 | End: 2018-01-01

## 2018-01-01 RX ORDER — METHYLPREDNISOLONE SODIUM SUCCINATE 125 MG/2ML
80 INJECTION, POWDER, LYOPHILIZED, FOR SOLUTION INTRAMUSCULAR; INTRAVENOUS EVERY 6 HOURS
Status: DISCONTINUED | OUTPATIENT
Start: 2018-01-01 | End: 2018-01-01

## 2018-01-01 RX ORDER — BISACODYL 10 MG
10 SUPPOSITORY, RECTAL RECTAL DAILY PRN
Status: DISCONTINUED | OUTPATIENT
Start: 2018-01-01 | End: 2018-01-01 | Stop reason: HOSPADM

## 2018-01-01 RX ORDER — LORAZEPAM 2 MG/ML
2 INJECTION INTRAMUSCULAR
Status: DISCONTINUED | OUTPATIENT
Start: 2018-01-01 | End: 2018-01-01 | Stop reason: HOSPADM

## 2018-01-01 RX ORDER — LORAZEPAM 2 MG/ML
4 INJECTION INTRAMUSCULAR
Status: DISCONTINUED | OUTPATIENT
Start: 2018-01-01 | End: 2018-01-01 | Stop reason: HOSPADM

## 2018-01-01 RX ADMIN — SODIUM CHLORIDE, PRESERVATIVE FREE 10 ML: 5 INJECTION INTRAVENOUS at 09:28

## 2018-01-01 RX ADMIN — MORPHINE SULFATE 4 MG: 4 INJECTION INTRAVENOUS at 16:03

## 2018-01-01 RX ADMIN — METHYLPREDNISOLONE SODIUM SUCCINATE 80 MG: 125 INJECTION, POWDER, FOR SOLUTION INTRAMUSCULAR; INTRAVENOUS at 21:18

## 2018-01-01 RX ADMIN — CARBOXYMETHYLCELLULOSE SODIUM 1 DROP: 10 GEL OPHTHALMIC at 15:10

## 2018-01-01 RX ADMIN — LORAZEPAM 2 MG: 2 INJECTION INTRAMUSCULAR; INTRAVENOUS at 08:16

## 2018-01-01 RX ADMIN — Medication 15 ML: at 08:40

## 2018-01-01 RX ADMIN — SODIUM CHLORIDE: 9 INJECTION, SOLUTION INTRAVENOUS at 21:50

## 2018-01-01 RX ADMIN — PROPOFOL 50 MCG/KG/MIN: 10 INJECTION, EMULSION INTRAVENOUS at 09:44

## 2018-01-01 RX ADMIN — LORAZEPAM 2 MG: 2 INJECTION INTRAMUSCULAR; INTRAVENOUS at 12:14

## 2018-01-01 RX ADMIN — ENOXAPARIN SODIUM 40 MG: 40 INJECTION SUBCUTANEOUS at 09:09

## 2018-01-01 RX ADMIN — ENOXAPARIN SODIUM 40 MG: 40 INJECTION SUBCUTANEOUS at 08:40

## 2018-01-01 RX ADMIN — SODIUM CHLORIDE 500 ML: 9 INJECTION, SOLUTION INTRAVENOUS at 19:25

## 2018-01-01 RX ADMIN — ENOXAPARIN SODIUM 40 MG: 40 INJECTION SUBCUTANEOUS at 09:28

## 2018-01-01 RX ADMIN — IPRATROPIUM BROMIDE AND ALBUTEROL SULFATE 1 AMPULE: .5; 3 SOLUTION RESPIRATORY (INHALATION) at 07:16

## 2018-01-01 RX ADMIN — LORAZEPAM 2 MG: 2 INJECTION INTRAMUSCULAR; INTRAVENOUS at 08:10

## 2018-01-01 RX ADMIN — SODIUM CHLORIDE, PRESERVATIVE FREE 10 ML: 5 INJECTION INTRAVENOUS at 09:46

## 2018-01-01 RX ADMIN — Medication 15 ML: at 09:30

## 2018-01-01 RX ADMIN — PANTOPRAZOLE SODIUM 40 MG: 40 INJECTION, POWDER, FOR SOLUTION INTRAVENOUS at 08:40

## 2018-01-01 RX ADMIN — METHYLPREDNISOLONE SODIUM SUCCINATE 80 MG: 125 INJECTION, POWDER, FOR SOLUTION INTRAMUSCULAR; INTRAVENOUS at 15:41

## 2018-01-01 RX ADMIN — BUDESONIDE 500 MCG: 0.5 SUSPENSION RESPIRATORY (INHALATION) at 07:52

## 2018-01-01 RX ADMIN — PROPOFOL 20 MCG/KG/MIN: 10 INJECTION, EMULSION INTRAVENOUS at 09:11

## 2018-01-01 RX ADMIN — IPRATROPIUM BROMIDE AND ALBUTEROL SULFATE 1 AMPULE: .5; 3 SOLUTION RESPIRATORY (INHALATION) at 23:38

## 2018-01-01 RX ADMIN — FENTANYL CITRATE 100 MCG/HR: 50 INJECTION INTRAVENOUS at 05:01

## 2018-01-01 RX ADMIN — IPRATROPIUM BROMIDE AND ALBUTEROL SULFATE 1 AMPULE: .5; 3 SOLUTION RESPIRATORY (INHALATION) at 03:21

## 2018-01-01 RX ADMIN — DILTIAZEM HYDROCHLORIDE 5 MG/HR: 5 INJECTION INTRAVENOUS at 00:02

## 2018-01-01 RX ADMIN — IPRATROPIUM BROMIDE AND ALBUTEROL SULFATE 1 AMPULE: .5; 3 SOLUTION RESPIRATORY (INHALATION) at 12:01

## 2018-01-01 RX ADMIN — ENOXAPARIN SODIUM 40 MG: 40 INJECTION SUBCUTANEOUS at 09:54

## 2018-01-01 RX ADMIN — MORPHINE SULFATE 4 MG: 2 INJECTION, SOLUTION INTRAMUSCULAR; INTRAVENOUS at 05:20

## 2018-01-01 RX ADMIN — PIPERACILLIN SODIUM,TAZOBACTAM SODIUM 3.38 G: 3; .375 INJECTION, POWDER, FOR SOLUTION INTRAVENOUS at 09:15

## 2018-01-01 RX ADMIN — IPRATROPIUM BROMIDE AND ALBUTEROL SULFATE 1 AMPULE: .5; 3 SOLUTION RESPIRATORY (INHALATION) at 20:01

## 2018-01-01 RX ADMIN — PROPOFOL 39.96 MCG/KG/MIN: 10 INJECTION, EMULSION INTRAVENOUS at 23:16

## 2018-01-01 RX ADMIN — IPRATROPIUM BROMIDE AND ALBUTEROL SULFATE 1 AMPULE: .5; 3 SOLUTION RESPIRATORY (INHALATION) at 08:29

## 2018-01-01 RX ADMIN — PIPERACILLIN SODIUM,TAZOBACTAM SODIUM 3.38 G: 3; .375 INJECTION, POWDER, FOR SOLUTION INTRAVENOUS at 16:13

## 2018-01-01 RX ADMIN — PROPOFOL 40 MCG/KG/MIN: 10 INJECTION, EMULSION INTRAVENOUS at 18:48

## 2018-01-01 RX ADMIN — SODIUM CHLORIDE: 9 INJECTION, SOLUTION INTRAVENOUS at 06:14

## 2018-01-01 RX ADMIN — GUAIFENESIN 600 MG: 600 TABLET, EXTENDED RELEASE ORAL at 20:00

## 2018-01-01 RX ADMIN — SODIUM CHLORIDE, PRESERVATIVE FREE 10 ML: 5 INJECTION INTRAVENOUS at 10:34

## 2018-01-01 RX ADMIN — BUDESONIDE 500 MCG: 0.5 SUSPENSION RESPIRATORY (INHALATION) at 19:51

## 2018-01-01 RX ADMIN — MORPHINE SULFATE 2 MG: 2 INJECTION, SOLUTION INTRAMUSCULAR; INTRAVENOUS at 09:03

## 2018-01-01 RX ADMIN — LORAZEPAM 2 MG: 2 INJECTION INTRAMUSCULAR; INTRAVENOUS at 02:52

## 2018-01-01 RX ADMIN — SODIUM CHLORIDE: 9 INJECTION, SOLUTION INTRAVENOUS at 05:22

## 2018-01-01 RX ADMIN — MORPHINE SULFATE 2 MG: 2 INJECTION, SOLUTION INTRAMUSCULAR; INTRAVENOUS at 01:08

## 2018-01-01 RX ADMIN — METHYLPREDNISOLONE SODIUM SUCCINATE 80 MG: 125 INJECTION, POWDER, FOR SOLUTION INTRAMUSCULAR; INTRAVENOUS at 03:18

## 2018-01-01 RX ADMIN — IOPAMIDOL 75 ML: 755 INJECTION, SOLUTION INTRAVENOUS at 16:41

## 2018-01-01 RX ADMIN — DOCUSATE SODIUM 100 MG: 50 LIQUID ORAL at 21:06

## 2018-01-01 RX ADMIN — MORPHINE SULFATE 4 MG: 4 INJECTION INTRAVENOUS at 19:04

## 2018-01-01 RX ADMIN — SODIUM CHLORIDE, PRESERVATIVE FREE 10 ML: 5 INJECTION INTRAVENOUS at 03:39

## 2018-01-01 RX ADMIN — ATROPINE SULFATE 1 DROP: 10 SOLUTION/ DROPS OPHTHALMIC at 06:38

## 2018-01-01 RX ADMIN — PIPERACILLIN SODIUM,TAZOBACTAM SODIUM 3.38 G: 3; .375 INJECTION, POWDER, FOR SOLUTION INTRAVENOUS at 23:44

## 2018-01-01 RX ADMIN — LORAZEPAM 4 MG: 2 INJECTION INTRAMUSCULAR; INTRAVENOUS at 06:26

## 2018-01-01 RX ADMIN — PIPERACILLIN SODIUM,TAZOBACTAM SODIUM 3.38 G: 3; .375 INJECTION, POWDER, FOR SOLUTION INTRAVENOUS at 16:50

## 2018-01-01 RX ADMIN — DOCUSATE SODIUM 100 MG: 50 LIQUID ORAL at 08:39

## 2018-01-01 RX ADMIN — ATROPINE SULFATE 1 DROP: 10 SOLUTION/ DROPS OPHTHALMIC at 08:16

## 2018-01-01 RX ADMIN — LORAZEPAM 4 MG: 2 INJECTION INTRAMUSCULAR; INTRAVENOUS at 19:03

## 2018-01-01 RX ADMIN — IPRATROPIUM BROMIDE AND ALBUTEROL SULFATE 1 AMPULE: .5; 3 SOLUTION RESPIRATORY (INHALATION) at 04:00

## 2018-01-01 RX ADMIN — MORPHINE SULFATE 2 MG: 2 INJECTION, SOLUTION INTRAMUSCULAR; INTRAVENOUS at 17:14

## 2018-01-01 RX ADMIN — PROPOFOL 40 MCG/KG/MIN: 10 INJECTION, EMULSION INTRAVENOUS at 04:07

## 2018-01-01 RX ADMIN — MORPHINE SULFATE 2 MG: 2 INJECTION, SOLUTION INTRAMUSCULAR; INTRAVENOUS at 10:06

## 2018-01-01 RX ADMIN — PIPERACILLIN SODIUM,TAZOBACTAM SODIUM 3.38 G: 3; .375 INJECTION, POWDER, FOR SOLUTION INTRAVENOUS at 23:30

## 2018-01-01 RX ADMIN — AMIODARONE HYDROCHLORIDE 150 MG: 150 INJECTION, SOLUTION INTRAVENOUS at 00:59

## 2018-01-01 RX ADMIN — SODIUM CHLORIDE 500 ML: 9 INJECTION, SOLUTION INTRAVENOUS at 10:50

## 2018-01-01 RX ADMIN — IPRATROPIUM BROMIDE AND ALBUTEROL SULFATE 1 AMPULE: .5; 3 SOLUTION RESPIRATORY (INHALATION) at 03:50

## 2018-01-01 RX ADMIN — MORPHINE SULFATE 4 MG: 4 INJECTION INTRAVENOUS at 17:46

## 2018-01-01 RX ADMIN — ATROPINE SULFATE 1 DROP: 10 SOLUTION/ DROPS OPHTHALMIC at 01:09

## 2018-01-01 RX ADMIN — MAGNESIUM SULFATE 2 G: 500 INJECTION, SOLUTION INTRAMUSCULAR; INTRAVENOUS at 18:40

## 2018-01-01 RX ADMIN — MORPHINE SULFATE 2 MG: 2 INJECTION, SOLUTION INTRAMUSCULAR; INTRAVENOUS at 19:31

## 2018-01-01 RX ADMIN — AMIODARONE HYDROCHLORIDE 150 MG: 150 INJECTION, SOLUTION INTRAVENOUS at 09:00

## 2018-01-01 RX ADMIN — LEVOFLOXACIN 750 MG: 5 INJECTION, SOLUTION INTRAVENOUS at 04:13

## 2018-01-01 RX ADMIN — PANTOPRAZOLE SODIUM 40 MG: 40 INJECTION, POWDER, FOR SOLUTION INTRAVENOUS at 16:13

## 2018-01-01 RX ADMIN — PIPERACILLIN SODIUM,TAZOBACTAM SODIUM 3.38 G: 3; .375 INJECTION, POWDER, FOR SOLUTION INTRAVENOUS at 23:52

## 2018-01-01 RX ADMIN — PIPERACILLIN SODIUM,TAZOBACTAM SODIUM 3.38 G: 3; .375 INJECTION, POWDER, FOR SOLUTION INTRAVENOUS at 15:42

## 2018-01-01 RX ADMIN — CARBOXYMETHYLCELLULOSE SODIUM 1 DROP: 10 GEL OPHTHALMIC at 14:20

## 2018-01-01 RX ADMIN — SODIUM CHLORIDE, PRESERVATIVE FREE 10 ML: 5 INJECTION INTRAVENOUS at 21:06

## 2018-01-01 RX ADMIN — LORAZEPAM 2 MG: 2 INJECTION INTRAMUSCULAR; INTRAVENOUS at 16:03

## 2018-01-01 RX ADMIN — LORAZEPAM 2 MG: 2 INJECTION INTRAMUSCULAR; INTRAVENOUS at 04:23

## 2018-01-01 RX ADMIN — METHYLPREDNISOLONE SODIUM SUCCINATE 80 MG: 125 INJECTION, POWDER, FOR SOLUTION INTRAMUSCULAR; INTRAVENOUS at 20:07

## 2018-01-01 RX ADMIN — MORPHINE SULFATE 2 MG: 2 INJECTION, SOLUTION INTRAMUSCULAR; INTRAVENOUS at 17:41

## 2018-01-01 RX ADMIN — SODIUM CHLORIDE: 9 INJECTION, SOLUTION INTRAVENOUS at 01:13

## 2018-01-01 RX ADMIN — Medication 15 ML: at 21:07

## 2018-01-01 RX ADMIN — SODIUM CHLORIDE 1000 ML: 9 INJECTION, SOLUTION INTRAVENOUS at 17:53

## 2018-01-01 RX ADMIN — PROPOFOL 25 MCG/KG/MIN: 10 INJECTION, EMULSION INTRAVENOUS at 09:15

## 2018-01-01 RX ADMIN — GUAIFENESIN 600 MG: 600 TABLET, EXTENDED RELEASE ORAL at 08:58

## 2018-01-01 RX ADMIN — FENTANYL CITRATE 75 MCG/HR: 50 INJECTION INTRAVENOUS at 04:58

## 2018-01-01 RX ADMIN — SODIUM CHLORIDE, PRESERVATIVE FREE 10 ML: 5 INJECTION INTRAVENOUS at 06:35

## 2018-01-01 RX ADMIN — MORPHINE SULFATE 2 MG: 2 INJECTION, SOLUTION INTRAMUSCULAR; INTRAVENOUS at 03:39

## 2018-01-01 RX ADMIN — MORPHINE SULFATE 4 MG: 4 INJECTION INTRAVENOUS at 12:14

## 2018-01-01 RX ADMIN — SODIUM CHLORIDE 1000 ML: 9 INJECTION, SOLUTION INTRAVENOUS at 16:00

## 2018-01-01 RX ADMIN — METHYLPREDNISOLONE SODIUM SUCCINATE 80 MG: 125 INJECTION, POWDER, FOR SOLUTION INTRAMUSCULAR; INTRAVENOUS at 03:00

## 2018-01-01 RX ADMIN — DEXTROSE MONOHYDRATE 500 MG: 50 INJECTION, SOLUTION INTRAVENOUS at 16:00

## 2018-01-01 RX ADMIN — MORPHINE SULFATE 2 MG: 2 INJECTION, SOLUTION INTRAMUSCULAR; INTRAVENOUS at 03:17

## 2018-01-01 RX ADMIN — SODIUM CHLORIDE, PRESERVATIVE FREE 10 ML: 5 INJECTION INTRAVENOUS at 20:00

## 2018-01-01 RX ADMIN — MORPHINE SULFATE 4 MG: 4 INJECTION INTRAVENOUS at 13:05

## 2018-01-01 RX ADMIN — IPRATROPIUM BROMIDE AND ALBUTEROL SULFATE 1 AMPULE: .5; 3 SOLUTION RESPIRATORY (INHALATION) at 21:29

## 2018-01-01 RX ADMIN — PIPERACILLIN SODIUM,TAZOBACTAM SODIUM 3.38 G: 3; .375 INJECTION, POWDER, FOR SOLUTION INTRAVENOUS at 09:01

## 2018-01-01 RX ADMIN — LORAZEPAM 2 MG: 2 INJECTION INTRAMUSCULAR; INTRAVENOUS at 10:30

## 2018-01-01 RX ADMIN — ACETAMINOPHEN 650 MG: 325 TABLET ORAL at 17:14

## 2018-01-01 RX ADMIN — FENTANYL CITRATE 50 MCG/HR: 50 INJECTION INTRAVENOUS at 09:43

## 2018-01-01 RX ADMIN — GUAIFENESIN 600 MG: 600 TABLET, EXTENDED RELEASE ORAL at 20:06

## 2018-01-01 RX ADMIN — CEFTRIAXONE SODIUM 1 G: 1 INJECTION, POWDER, FOR SOLUTION INTRAMUSCULAR; INTRAVENOUS at 15:59

## 2018-01-01 RX ADMIN — SODIUM CHLORIDE: 9 INJECTION, SOLUTION INTRAVENOUS at 21:22

## 2018-01-01 RX ADMIN — METHYLPREDNISOLONE SODIUM SUCCINATE 80 MG: 125 INJECTION, POWDER, FOR SOLUTION INTRAMUSCULAR; INTRAVENOUS at 10:34

## 2018-01-01 RX ADMIN — IPRATROPIUM BROMIDE AND ALBUTEROL SULFATE 1 AMPULE: .5; 3 SOLUTION RESPIRATORY (INHALATION) at 08:42

## 2018-01-01 RX ADMIN — MORPHINE SULFATE 2 MG: 2 INJECTION, SOLUTION INTRAMUSCULAR; INTRAVENOUS at 22:08

## 2018-01-01 RX ADMIN — SODIUM CHLORIDE, PRESERVATIVE FREE 10 ML: 5 INJECTION INTRAVENOUS at 19:34

## 2018-01-01 RX ADMIN — SODIUM CHLORIDE, PRESERVATIVE FREE 10 ML: 5 INJECTION INTRAVENOUS at 08:40

## 2018-01-01 RX ADMIN — IPRATROPIUM BROMIDE AND ALBUTEROL SULFATE 1 AMPULE: .5; 3 SOLUTION RESPIRATORY (INHALATION) at 16:22

## 2018-01-01 RX ADMIN — MUPIROCIN: 20 OINTMENT TOPICAL at 08:41

## 2018-01-01 RX ADMIN — AMIODARONE HYDROCHLORIDE 1 MG/MIN: 50 INJECTION, SOLUTION INTRAVENOUS at 01:11

## 2018-01-01 RX ADMIN — PROPOFOL 30 MCG/KG/MIN: 10 INJECTION, EMULSION INTRAVENOUS at 13:11

## 2018-01-01 RX ADMIN — LORAZEPAM 4 MG: 2 INJECTION INTRAMUSCULAR; INTRAVENOUS at 15:14

## 2018-01-01 RX ADMIN — MUPIROCIN: 20 OINTMENT TOPICAL at 09:28

## 2018-01-01 RX ADMIN — IPRATROPIUM BROMIDE AND ALBUTEROL SULFATE 1 AMPULE: .5; 3 SOLUTION RESPIRATORY (INHALATION) at 16:12

## 2018-01-01 RX ADMIN — CEFTRIAXONE SODIUM 1 G: 1 INJECTION, POWDER, FOR SOLUTION INTRAMUSCULAR; INTRAVENOUS at 17:53

## 2018-01-01 RX ADMIN — SODIUM CHLORIDE: 9 INJECTION, SOLUTION INTRAVENOUS at 02:32

## 2018-01-01 RX ADMIN — FENTANYL CITRATE 50 MCG/HR: 50 INJECTION INTRAVENOUS at 05:13

## 2018-01-01 RX ADMIN — METHYLPREDNISOLONE SODIUM SUCCINATE 80 MG: 125 INJECTION, POWDER, FOR SOLUTION INTRAMUSCULAR; INTRAVENOUS at 20:00

## 2018-01-01 RX ADMIN — DOCUSATE SODIUM 100 MG: 50 LIQUID ORAL at 09:28

## 2018-01-01 RX ADMIN — GUAIFENESIN 600 MG: 600 TABLET, EXTENDED RELEASE ORAL at 09:54

## 2018-01-01 RX ADMIN — SODIUM CHLORIDE, PRESERVATIVE FREE 10 ML: 5 INJECTION INTRAVENOUS at 20:07

## 2018-01-01 RX ADMIN — METHYLPREDNISOLONE SODIUM SUCCINATE 80 MG: 125 INJECTION, POWDER, FOR SOLUTION INTRAMUSCULAR; INTRAVENOUS at 04:13

## 2018-01-01 RX ADMIN — IPRATROPIUM BROMIDE AND ALBUTEROL SULFATE 1 AMPULE: .5; 3 SOLUTION RESPIRATORY (INHALATION) at 04:12

## 2018-01-01 RX ADMIN — IPRATROPIUM BROMIDE AND ALBUTEROL SULFATE 1 AMPULE: .5; 3 SOLUTION RESPIRATORY (INHALATION) at 16:06

## 2018-01-01 RX ADMIN — LORAZEPAM 2 MG: 2 INJECTION INTRAMUSCULAR; INTRAVENOUS at 23:39

## 2018-01-01 RX ADMIN — MORPHINE SULFATE 2 MG: 2 INJECTION, SOLUTION INTRAMUSCULAR; INTRAVENOUS at 13:15

## 2018-01-01 RX ADMIN — FENTANYL CITRATE 100 MCG/HR: 50 INJECTION INTRAVENOUS at 21:16

## 2018-01-01 RX ADMIN — SODIUM CHLORIDE: 9 INJECTION, SOLUTION INTRAVENOUS at 12:23

## 2018-01-01 RX ADMIN — BUDESONIDE 500 MCG: 0.5 SUSPENSION RESPIRATORY (INHALATION) at 08:42

## 2018-01-01 RX ADMIN — IPRATROPIUM BROMIDE AND ALBUTEROL SULFATE 1 AMPULE: .5; 3 SOLUTION RESPIRATORY (INHALATION) at 08:06

## 2018-01-01 RX ADMIN — ONDANSETRON HYDROCHLORIDE 4 MG: 2 INJECTION, SOLUTION INTRAVENOUS at 18:57

## 2018-01-01 RX ADMIN — CARBOXYMETHYLCELLULOSE SODIUM 1 DROP: 10 GEL OPHTHALMIC at 09:29

## 2018-01-01 RX ADMIN — METHYLPREDNISOLONE SODIUM SUCCINATE 125 MG: 125 INJECTION, POWDER, FOR SOLUTION INTRAMUSCULAR; INTRAVENOUS at 16:13

## 2018-01-01 RX ADMIN — IPRATROPIUM BROMIDE AND ALBUTEROL SULFATE 1 AMPULE: .5; 3 SOLUTION RESPIRATORY (INHALATION) at 15:21

## 2018-01-01 RX ADMIN — PIPERACILLIN SODIUM,TAZOBACTAM SODIUM 3.38 G: 3; .375 INJECTION, POWDER, FOR SOLUTION INTRAVENOUS at 23:27

## 2018-01-01 RX ADMIN — IPRATROPIUM BROMIDE AND ALBUTEROL SULFATE 1 AMPULE: .5; 3 SOLUTION RESPIRATORY (INHALATION) at 19:51

## 2018-01-01 RX ADMIN — ENOXAPARIN SODIUM 40 MG: 40 INJECTION SUBCUTANEOUS at 09:00

## 2018-01-01 RX ADMIN — IPRATROPIUM BROMIDE AND ALBUTEROL SULFATE 1 AMPULE: .5; 3 SOLUTION RESPIRATORY (INHALATION) at 10:54

## 2018-01-01 RX ADMIN — SODIUM CHLORIDE, PRESERVATIVE FREE 10 ML: 5 INJECTION INTRAVENOUS at 04:23

## 2018-01-01 RX ADMIN — MORPHINE SULFATE 2 MG: 2 INJECTION, SOLUTION INTRAMUSCULAR; INTRAVENOUS at 21:22

## 2018-01-01 RX ADMIN — MORPHINE SULFATE 4 MG: 4 INJECTION, SOLUTION INTRAMUSCULAR; INTRAVENOUS at 19:00

## 2018-01-01 RX ADMIN — PIPERACILLIN SODIUM,TAZOBACTAM SODIUM 3.38 G: 3; .375 INJECTION, POWDER, FOR SOLUTION INTRAVENOUS at 07:01

## 2018-01-01 RX ADMIN — PROPOFOL 20 MCG/KG/MIN: 10 INJECTION, EMULSION INTRAVENOUS at 00:05

## 2018-01-01 RX ADMIN — IPRATROPIUM BROMIDE AND ALBUTEROL SULFATE 1 AMPULE: .5; 3 SOLUTION RESPIRATORY (INHALATION) at 12:08

## 2018-01-01 RX ADMIN — MORPHINE SULFATE 4 MG: 4 INJECTION INTRAVENOUS at 01:11

## 2018-01-01 RX ADMIN — LORAZEPAM 4 MG: 2 INJECTION INTRAMUSCULAR; INTRAVENOUS at 18:16

## 2018-01-01 RX ADMIN — MORPHINE SULFATE 4 MG: 4 INJECTION INTRAVENOUS at 05:26

## 2018-01-01 RX ADMIN — PROPOFOL 25 MCG/KG/MIN: 10 INJECTION, EMULSION INTRAVENOUS at 16:31

## 2018-01-01 RX ADMIN — METHYLPREDNISOLONE SODIUM SUCCINATE 80 MG: 125 INJECTION, POWDER, FOR SOLUTION INTRAMUSCULAR; INTRAVENOUS at 09:06

## 2018-01-01 RX ADMIN — BUDESONIDE 500 MCG: 0.5 SUSPENSION RESPIRATORY (INHALATION) at 20:54

## 2018-01-01 RX ADMIN — IPRATROPIUM BROMIDE AND ALBUTEROL SULFATE 1 AMPULE: .5; 3 SOLUTION RESPIRATORY (INHALATION) at 00:07

## 2018-01-01 RX ADMIN — PIPERACILLIN SODIUM,TAZOBACTAM SODIUM 3.38 G: 3; .375 INJECTION, POWDER, FOR SOLUTION INTRAVENOUS at 07:35

## 2018-01-01 RX ADMIN — LORAZEPAM 2 MG: 2 INJECTION INTRAMUSCULAR; INTRAVENOUS at 20:45

## 2018-01-01 RX ADMIN — LORAZEPAM 4 MG: 2 INJECTION INTRAMUSCULAR; INTRAVENOUS at 17:23

## 2018-01-01 RX ADMIN — SODIUM CHLORIDE, PRESERVATIVE FREE 10 ML: 5 INJECTION INTRAVENOUS at 05:21

## 2018-01-01 RX ADMIN — SODIUM CHLORIDE, PRESERVATIVE FREE 10 ML: 5 INJECTION INTRAVENOUS at 08:17

## 2018-01-01 RX ADMIN — FENTANYL CITRATE 100 MCG/HR: 50 INJECTION INTRAVENOUS at 15:16

## 2018-01-01 RX ADMIN — PANTOPRAZOLE SODIUM 40 MG: 40 INJECTION, POWDER, FOR SOLUTION INTRAVENOUS at 09:11

## 2018-01-01 RX ADMIN — METHYLPREDNISOLONE SODIUM SUCCINATE 80 MG: 125 INJECTION, POWDER, FOR SOLUTION INTRAMUSCULAR; INTRAVENOUS at 04:07

## 2018-01-01 RX ADMIN — KETOROLAC TROMETHAMINE 30 MG: 30 INJECTION, SOLUTION INTRAMUSCULAR at 11:55

## 2018-01-01 RX ADMIN — IPRATROPIUM BROMIDE AND ALBUTEROL SULFATE 1 AMPULE: .5; 3 SOLUTION RESPIRATORY (INHALATION) at 07:51

## 2018-01-01 RX ADMIN — IPRATROPIUM BROMIDE AND ALBUTEROL SULFATE 1 AMPULE: .5; 3 SOLUTION RESPIRATORY (INHALATION) at 15:57

## 2018-01-01 RX ADMIN — PIPERACILLIN SODIUM,TAZOBACTAM SODIUM 3.38 G: 3; .375 INJECTION, POWDER, FOR SOLUTION INTRAVENOUS at 00:53

## 2018-01-01 RX ADMIN — SODIUM CHLORIDE, PRESERVATIVE FREE 10 ML: 5 INJECTION INTRAVENOUS at 09:36

## 2018-01-01 RX ADMIN — CARBOXYMETHYLCELLULOSE SODIUM 1 DROP: 10 GEL OPHTHALMIC at 08:40

## 2018-01-01 RX ADMIN — METOPROLOL TARTRATE 5 MG: 1 INJECTION, SOLUTION INTRAVENOUS at 19:18

## 2018-01-01 RX ADMIN — LORAZEPAM 2 MG: 2 INJECTION INTRAMUSCULAR; INTRAVENOUS at 19:33

## 2018-01-01 RX ADMIN — PANTOPRAZOLE SODIUM 40 MG: 40 INJECTION, POWDER, FOR SOLUTION INTRAVENOUS at 08:58

## 2018-01-01 RX ADMIN — PIPERACILLIN SODIUM,TAZOBACTAM SODIUM 3.38 G: 3; .375 INJECTION, POWDER, FOR SOLUTION INTRAVENOUS at 16:05

## 2018-01-01 RX ADMIN — MORPHINE SULFATE 2 MG: 2 INJECTION, SOLUTION INTRAMUSCULAR; INTRAVENOUS at 21:59

## 2018-01-01 RX ADMIN — MORPHINE SULFATE 4 MG: 4 INJECTION INTRAVENOUS at 08:10

## 2018-01-01 RX ADMIN — IPRATROPIUM BROMIDE AND ALBUTEROL SULFATE 1 AMPULE: .5; 3 SOLUTION RESPIRATORY (INHALATION) at 00:36

## 2018-01-01 RX ADMIN — SODIUM CHLORIDE, PRESERVATIVE FREE 10 ML: 5 INJECTION INTRAVENOUS at 05:46

## 2018-01-01 RX ADMIN — MORPHINE SULFATE 4 MG: 4 INJECTION INTRAVENOUS at 22:25

## 2018-01-01 RX ADMIN — IPRATROPIUM BROMIDE AND ALBUTEROL SULFATE 1 AMPULE: .5; 3 SOLUTION RESPIRATORY (INHALATION) at 15:04

## 2018-01-01 RX ADMIN — SODIUM CHLORIDE 1000 ML: 9 INJECTION, SOLUTION INTRAVENOUS at 21:43

## 2018-01-01 RX ADMIN — SODIUM CHLORIDE, PRESERVATIVE FREE 10 ML: 5 INJECTION INTRAVENOUS at 08:39

## 2018-01-01 RX ADMIN — SODIUM CHLORIDE, PRESERVATIVE FREE 10 ML: 5 INJECTION INTRAVENOUS at 08:16

## 2018-01-01 RX ADMIN — METHYLPREDNISOLONE SODIUM SUCCINATE 40 MG: 40 INJECTION, POWDER, FOR SOLUTION INTRAMUSCULAR; INTRAVENOUS at 21:06

## 2018-01-01 RX ADMIN — IPRATROPIUM BROMIDE AND ALBUTEROL SULFATE 1 AMPULE: .5; 3 SOLUTION RESPIRATORY (INHALATION) at 02:58

## 2018-01-01 RX ADMIN — IPRATROPIUM BROMIDE AND ALBUTEROL SULFATE 1 AMPULE: .5; 3 SOLUTION RESPIRATORY (INHALATION) at 07:59

## 2018-01-01 RX ADMIN — LORAZEPAM 4 MG: 2 INJECTION INTRAMUSCULAR; INTRAVENOUS at 11:55

## 2018-01-01 RX ADMIN — MORPHINE SULFATE 4 MG: 4 INJECTION INTRAVENOUS at 06:35

## 2018-01-01 RX ADMIN — CARBOXYMETHYLCELLULOSE SODIUM 1 DROP: 10 GEL OPHTHALMIC at 21:06

## 2018-01-01 RX ADMIN — MORPHINE SULFATE 2 MG: 2 INJECTION, SOLUTION INTRAMUSCULAR; INTRAVENOUS at 04:17

## 2018-01-01 RX ADMIN — IPRATROPIUM BROMIDE AND ALBUTEROL SULFATE 1 AMPULE: .5; 3 SOLUTION RESPIRATORY (INHALATION) at 16:52

## 2018-01-01 RX ADMIN — IPRATROPIUM BROMIDE AND ALBUTEROL SULFATE 1 AMPULE: .5; 3 SOLUTION RESPIRATORY (INHALATION) at 11:36

## 2018-01-01 RX ADMIN — DIGOXIN 125 MCG: 250 INJECTION, SOLUTION INTRAMUSCULAR; INTRAVENOUS; PARENTERAL at 06:21

## 2018-01-01 RX ADMIN — BUDESONIDE 500 MCG: 0.5 SUSPENSION RESPIRATORY (INHALATION) at 08:29

## 2018-01-01 RX ADMIN — METHYLPREDNISOLONE SODIUM SUCCINATE 80 MG: 125 INJECTION, POWDER, FOR SOLUTION INTRAMUSCULAR; INTRAVENOUS at 16:07

## 2018-01-01 RX ADMIN — IPRATROPIUM BROMIDE AND ALBUTEROL SULFATE 1 AMPULE: .5; 3 SOLUTION RESPIRATORY (INHALATION) at 15:49

## 2018-01-01 RX ADMIN — GUAIFENESIN 600 MG: 600 TABLET, EXTENDED RELEASE ORAL at 02:32

## 2018-01-01 RX ADMIN — MUPIROCIN: 20 OINTMENT TOPICAL at 21:06

## 2018-01-01 RX ADMIN — PIPERACILLIN SODIUM,TAZOBACTAM SODIUM 3.38 G: 3; .375 INJECTION, POWDER, FOR SOLUTION INTRAVENOUS at 16:30

## 2018-01-01 RX ADMIN — ATROPINE SULFATE 1 DROP: 10 SOLUTION/ DROPS OPHTHALMIC at 18:34

## 2018-01-01 RX ADMIN — PROPOFOL 50 MCG/KG/MIN: 10 INJECTION, EMULSION INTRAVENOUS at 21:12

## 2018-01-01 RX ADMIN — PANTOPRAZOLE SODIUM 40 MG: 40 INJECTION, POWDER, FOR SOLUTION INTRAVENOUS at 09:28

## 2018-01-01 RX ADMIN — METHYLPREDNISOLONE SODIUM SUCCINATE 80 MG: 125 INJECTION, POWDER, FOR SOLUTION INTRAMUSCULAR; INTRAVENOUS at 16:31

## 2018-01-01 RX ADMIN — LORAZEPAM 2 MG: 2 INJECTION INTRAMUSCULAR; INTRAVENOUS at 01:12

## 2018-01-01 RX ADMIN — IPRATROPIUM BROMIDE AND ALBUTEROL SULFATE 1 AMPULE: .5; 3 SOLUTION RESPIRATORY (INHALATION) at 23:41

## 2018-01-01 RX ADMIN — PANTOPRAZOLE SODIUM 40 MG: 40 INJECTION, POWDER, FOR SOLUTION INTRAVENOUS at 10:34

## 2018-01-01 RX ADMIN — DILTIAZEM HYDROCHLORIDE 10 MG: 5 INJECTION INTRAVENOUS at 23:57

## 2018-01-01 RX ADMIN — ATROPINE SULFATE 1 DROP: 10 SOLUTION/ DROPS OPHTHALMIC at 17:22

## 2018-01-01 RX ADMIN — IPRATROPIUM BROMIDE AND ALBUTEROL SULFATE 1 AMPULE: .5; 3 SOLUTION RESPIRATORY (INHALATION) at 12:44

## 2018-01-01 RX ADMIN — METHYLPREDNISOLONE SODIUM SUCCINATE 40 MG: 40 INJECTION, POWDER, FOR SOLUTION INTRAMUSCULAR; INTRAVENOUS at 08:40

## 2018-01-01 RX ADMIN — METHYLPREDNISOLONE SODIUM SUCCINATE 80 MG: 125 INJECTION, POWDER, FOR SOLUTION INTRAMUSCULAR; INTRAVENOUS at 08:59

## 2018-01-01 RX ADMIN — PROPOFOL 50 MCG/KG/MIN: 10 INJECTION, EMULSION INTRAVENOUS at 06:01

## 2018-01-01 RX ADMIN — SODIUM CHLORIDE, PRESERVATIVE FREE 10 ML: 5 INJECTION INTRAVENOUS at 02:32

## 2018-01-01 RX ADMIN — PROPOFOL 50 MG/HR: 10 INJECTION, EMULSION INTRAVENOUS at 09:34

## 2018-01-01 RX ADMIN — MORPHINE SULFATE 2 MG: 2 INJECTION, SOLUTION INTRAMUSCULAR; INTRAVENOUS at 02:41

## 2018-01-01 RX ADMIN — IPRATROPIUM BROMIDE AND ALBUTEROL SULFATE 1 AMPULE: .5; 3 SOLUTION RESPIRATORY (INHALATION) at 03:54

## 2018-01-01 RX ADMIN — METHYLPREDNISOLONE SODIUM SUCCINATE 80 MG: 125 INJECTION, POWDER, FOR SOLUTION INTRAMUSCULAR; INTRAVENOUS at 22:49

## 2018-01-01 RX ADMIN — ATROPINE SULFATE 1 DROP: 10 SOLUTION/ DROPS OPHTHALMIC at 11:55

## 2018-01-01 RX ADMIN — IPRATROPIUM BROMIDE AND ALBUTEROL SULFATE 1 AMPULE: .5; 3 SOLUTION RESPIRATORY (INHALATION) at 20:54

## 2018-01-01 RX ADMIN — IPRATROPIUM BROMIDE AND ALBUTEROL SULFATE 1 AMPULE: .5; 3 SOLUTION RESPIRATORY (INHALATION) at 21:00

## 2018-01-01 RX ADMIN — AMIODARONE HYDROCHLORIDE 0.5 MG/MIN: 50 INJECTION, SOLUTION INTRAVENOUS at 07:10

## 2018-01-01 RX ADMIN — CARBOXYMETHYLCELLULOSE SODIUM 1 DROP: 10 GEL OPHTHALMIC at 08:16

## 2018-01-01 RX ADMIN — LORAZEPAM 2 MG: 2 INJECTION INTRAMUSCULAR; INTRAVENOUS at 05:44

## 2018-01-01 RX ADMIN — SODIUM CHLORIDE, PRESERVATIVE FREE 10 ML: 5 INJECTION INTRAVENOUS at 18:16

## 2018-01-01 RX ADMIN — PIPERACILLIN SODIUM,TAZOBACTAM SODIUM 3.38 G: 3; .375 INJECTION, POWDER, FOR SOLUTION INTRAVENOUS at 08:43

## 2018-01-01 RX ADMIN — PROPOFOL 40 MCG/KG/MIN: 10 INJECTION, EMULSION INTRAVENOUS at 11:10

## 2018-01-01 RX ADMIN — METHYLPREDNISOLONE SODIUM SUCCINATE 125 MG: 125 INJECTION, POWDER, FOR SOLUTION INTRAMUSCULAR; INTRAVENOUS at 15:04

## 2018-01-01 ASSESSMENT — PULMONARY FUNCTION TESTS
PIF_VALUE: 30
PIF_VALUE: 28
PIF_VALUE: 14
PIF_VALUE: 14
PIF_VALUE: 29
PIF_VALUE: 21
PIF_VALUE: 31
PIF_VALUE: 29
PIF_VALUE: 23
PIF_VALUE: 14
PIF_VALUE: 32
PIF_VALUE: 30
PIF_VALUE: 14
PIF_VALUE: 14
PIF_VALUE: 21
PIF_VALUE: 15
PIF_VALUE: 36
PIF_VALUE: 24
PIF_VALUE: 22
PIF_VALUE: 26
PIF_VALUE: 30
PIF_VALUE: 26
PIF_VALUE: 14
PIF_VALUE: 32
PIF_VALUE: 30
PIF_VALUE: 31
PIF_VALUE: 19
PIF_VALUE: 17
PIF_VALUE: 18
PIF_VALUE: 26
PIF_VALUE: 27
PIF_VALUE: 33
PIF_VALUE: 25
PIF_VALUE: 14
PIF_VALUE: 14
PIF_VALUE: 21
PIF_VALUE: 23
PIF_VALUE: 33
PIF_VALUE: 19
PIF_VALUE: 16
PIF_VALUE: 20
PIF_VALUE: 17
PIF_VALUE: 14
PIF_VALUE: 29
PIF_VALUE: 21
PIF_VALUE: 21
PIF_VALUE: 26
PIF_VALUE: 29
PIF_VALUE: 19
PIF_VALUE: 30
PIF_VALUE: 14
PIF_VALUE: 16
PIF_VALUE: 25
PIF_VALUE: 16
PIF_VALUE: 25
PIF_VALUE: 29
PIF_VALUE: 30
PIF_VALUE: 21
PIF_VALUE: 23
PIF_VALUE: 25
PIF_VALUE: 23
PIF_VALUE: 19
PIF_VALUE: 31
PIF_VALUE: 33
PIF_VALUE: 20
PIF_VALUE: 31
PIF_VALUE: 23
PIF_VALUE: 12
PIF_VALUE: 14
PIF_VALUE: 25
PIF_VALUE: 30
PIF_VALUE: 27
PIF_VALUE: 27
PIF_VALUE: 31
PIF_VALUE: 23
PIF_VALUE: 20
PIF_VALUE: 30
PIF_VALUE: 29
PIF_VALUE: 20
PIF_VALUE: 29
PIF_VALUE: 30
PIF_VALUE: 24
PIF_VALUE: 29
PIF_VALUE: 29
PIF_VALUE: 28
PIF_VALUE: 24
PIF_VALUE: 15
PIF_VALUE: 14
PIF_VALUE: 20
PIF_VALUE: 22
PIF_VALUE: 22
PIF_VALUE: 14
PIF_VALUE: 30
PIF_VALUE: 26
PIF_VALUE: 14
PIF_VALUE: 18
PIF_VALUE: 14
PIF_VALUE: 14
PIF_VALUE: 25
PIF_VALUE: 31
PIF_VALUE: 20
PIF_VALUE: 31
PIF_VALUE: 29
PIF_VALUE: 19
PIF_VALUE: 26
PIF_VALUE: 25
PIF_VALUE: 20
PIF_VALUE: 25
PIF_VALUE: 29
PIF_VALUE: 29
PIF_VALUE: 17
PIF_VALUE: 30
PIF_VALUE: 29
PIF_VALUE: 19
PIF_VALUE: 14
PIF_VALUE: 29
PIF_VALUE: 15
PIF_VALUE: 44
PIF_VALUE: 30
PIF_VALUE: 14
PIF_VALUE: 31
PIF_VALUE: 19
PIF_VALUE: 14
PIF_VALUE: 21
PIF_VALUE: 20
PIF_VALUE: 25
PIF_VALUE: 14
PIF_VALUE: 14
PIF_VALUE: 23
PIF_VALUE: 26
PIF_VALUE: 31
PIF_VALUE: 17
PIF_VALUE: 14
PIF_VALUE: 20
PIF_VALUE: 20
PIF_VALUE: 24
PIF_VALUE: 34
PIF_VALUE: 17
PIF_VALUE: 32
PIF_VALUE: 22
PIF_VALUE: 26
PIF_VALUE: 33
PIF_VALUE: 14
PIF_VALUE: 29
PIF_VALUE: 30
PIF_VALUE: 29
PIF_VALUE: 31
PIF_VALUE: 25
PIF_VALUE: 30
PIF_VALUE: 28
PIF_VALUE: 32
PIF_VALUE: 28
PIF_VALUE: 20
PIF_VALUE: 22
PIF_VALUE: 31
PIF_VALUE: 14
PIF_VALUE: 30
PIF_VALUE: 27
PIF_VALUE: 27
PIF_VALUE: 29
PIF_VALUE: 19
PIF_VALUE: 27
PIF_VALUE: 15
PIF_VALUE: 28
PIF_VALUE: 31
PIF_VALUE: 14
PIF_VALUE: 14
PIF_VALUE: 28
PIF_VALUE: 30
PIF_VALUE: 30
PIF_VALUE: 14
PIF_VALUE: 20
PIF_VALUE: 22
PIF_VALUE: 27
PIF_VALUE: 29
PIF_VALUE: 23
PIF_VALUE: 14
PIF_VALUE: 21
PIF_VALUE: 23
PIF_VALUE: 26
PIF_VALUE: 14
PIF_VALUE: 30
PIF_VALUE: 35
PIF_VALUE: 15
PIF_VALUE: 14
PIF_VALUE: 20
PIF_VALUE: 32
PIF_VALUE: 21

## 2018-01-01 ASSESSMENT — PAIN DESCRIPTION - PROGRESSION
CLINICAL_PROGRESSION: NOT CHANGED

## 2018-01-01 ASSESSMENT — PAIN SCALES - GENERAL
PAINLEVEL_OUTOF10: 5
PAINLEVEL_OUTOF10: 8
PAINLEVEL_OUTOF10: 5
PAINLEVEL_OUTOF10: 5
PAINLEVEL_OUTOF10: 8
PAINLEVEL_OUTOF10: 0
PAINLEVEL_OUTOF10: 4
PAINLEVEL_OUTOF10: 0
PAINLEVEL_OUTOF10: 0
PAINLEVEL_OUTOF10: 5
PAINLEVEL_OUTOF10: 4
PAINLEVEL_OUTOF10: 6
PAINLEVEL_OUTOF10: 8
PAINLEVEL_OUTOF10: 8
PAINLEVEL_OUTOF10: 0
PAINLEVEL_OUTOF10: 5
PAINLEVEL_OUTOF10: 7
PAINLEVEL_OUTOF10: 0
PAINLEVEL_OUTOF10: 6
PAINLEVEL_OUTOF10: 0
PAINLEVEL_OUTOF10: 4
PAINLEVEL_OUTOF10: 7
PAINLEVEL_OUTOF10: 8
PAINLEVEL_OUTOF10: 6
PAINLEVEL_OUTOF10: 0
PAINLEVEL_OUTOF10: 7
PAINLEVEL_OUTOF10: 5
PAINLEVEL_OUTOF10: 5
PAINLEVEL_OUTOF10: 8
PAINLEVEL_OUTOF10: 0
PAINLEVEL_OUTOF10: 5
PAINLEVEL_OUTOF10: 0
PAINLEVEL_OUTOF10: 8

## 2018-01-01 ASSESSMENT — ENCOUNTER SYMPTOMS
COUGH: 1
NAUSEA: 1
WHEEZING: 1
SPUTUM PRODUCTION: 1
SHORTNESS OF BREATH: 1
ABDOMINAL PAIN: 0
VOMITING: 0

## 2018-01-01 ASSESSMENT — PAIN DESCRIPTION - ORIENTATION
ORIENTATION: RIGHT;LEFT;UPPER
ORIENTATION: RIGHT

## 2018-01-01 ASSESSMENT — PAIN DESCRIPTION - LOCATION
LOCATION: ABDOMEN
LOCATION: ABDOMEN
LOCATION: ABDOMEN;FLANK
LOCATION: CHEST
LOCATION: ABDOMEN;FLANK

## 2018-01-01 ASSESSMENT — PAIN DESCRIPTION - PAIN TYPE
TYPE: ACUTE PAIN

## 2018-01-01 ASSESSMENT — PAIN DESCRIPTION - DESCRIPTORS
DESCRIPTORS: ACHING
DESCRIPTORS: SHARP
DESCRIPTORS: ACHING;THROBBING
DESCRIPTORS: ACHING;THROBBING

## 2018-01-01 ASSESSMENT — PAIN DESCRIPTION - FREQUENCY
FREQUENCY: CONTINUOUS

## 2018-01-01 ASSESSMENT — PAIN DESCRIPTION - ONSET
ONSET: ON-GOING
ONSET: SUDDEN

## 2018-10-24 NOTE — ED NOTES
Discharge instructions reviewed with the patient. Verbalized understanding of prescribed medication including completing entire antibiotic course and follow up care. Denies questions/concerns. Patient is alert/oriented, stable, and ambulatory at the time of discharge.        Maddie Becker RN  10/24/18 3949

## 2018-12-05 PROBLEM — Z72.0 TOBACCO ABUSE: Status: ACTIVE | Noted: 2018-01-01

## 2018-12-05 PROBLEM — J18.9 PNEUMONIA: Status: ACTIVE | Noted: 2018-01-01

## 2018-12-05 PROBLEM — I47.29 NSVT (NONSUSTAINED VENTRICULAR TACHYCARDIA): Status: ACTIVE | Noted: 2018-01-01

## 2018-12-05 PROBLEM — C34.90 LUNG CANCER, PRIMARY, WITH METASTASIS FROM LUNG TO OTHER SITE, UNSPECIFIED LATERALITY (HCC): Status: ACTIVE | Noted: 2018-01-01

## 2018-12-05 PROBLEM — K76.9 LIVER LESION: Status: ACTIVE | Noted: 2018-01-01

## 2018-12-05 PROBLEM — E83.52 HYPERCALCEMIA: Status: ACTIVE | Noted: 2018-01-01

## 2018-12-05 PROBLEM — R91.8 LUNG MASS: Status: ACTIVE | Noted: 2018-01-01

## 2018-12-05 NOTE — ED PROVIDER NOTES
Triage Chief Complaint:   Fatigue (Wife states pt with increased generalized weakness and PO intake x approx 4 days. States diagnosed with lung CA in this ER in Oct anf has not had any f/u as of yet)    [place]:  Earline Peng is a 59 y.o. male that presents with weakness, fatigue, shortness of breath and intermittent chest pain. He states he has also noticed swelling in his feet and abdomen. He states he was diagnosed with lung cancer and has not had any follow-up or further treatment. He states he is willing to be admitted and to do what is necessary now. He does continue to smoke. He states he thought he was running fevers at home. He had fevers and chills, but never actually took his temperature. No nausea or vomiting. ROS:  General:  + fevers, + chills, + weakness  Eyes:  No recent vison changes, no discharge  ENT:  No sore throat, no nasal congestion, no hearing changes  Cardiovascular:  + chest pain, no palpitations  Respiratory:  + shortness of breath, +cough productive of white sputum, no wheezing  Gastrointestinal:  No pain, no nausea, no vomiting, no diarrhea, positive bloating  Musculoskeletal:  No muscle pain, no joint pain  Skin:  No rash, no pruritis, no easy bruising  Neurologic:  No speech problems, no headache, no extremity numbness, no extremity tingling, no extremity weakness  Psychiatric:  No anxiety  Genitourinary:  No dysuria, no hematuria  Endocrine:  No unexpected weight gain, no unexpected weight loss  Extremities:  no edema, no pain    History reviewed. No pertinent past medical history. History reviewed. No pertinent surgical history. History reviewed. No pertinent family history. Social History     Social History    Marital status:      Spouse name: N/A    Number of children: N/A    Years of education: N/A     Occupational History    Not on file.      Social History Main Topics    Smoking status: Current Some Day Smoker     Packs/day: 1.00     Types: Cigarettes

## 2018-12-06 PROBLEM — J44.1 COPD EXACERBATION (HCC): Status: ACTIVE | Noted: 2018-01-01

## 2018-12-06 PROBLEM — R64 CACHEXIA (HCC): Status: ACTIVE | Noted: 2018-01-01

## 2018-12-06 PROBLEM — R59.0 MEDIASTINAL ADENOPATHY: Status: ACTIVE | Noted: 2018-01-01

## 2018-12-06 PROBLEM — J96.01 ACUTE RESPIRATORY FAILURE WITH HYPOXIA (HCC): Status: ACTIVE | Noted: 2018-01-01

## 2018-12-06 NOTE — ED NOTES
Report given to Safia Nelson at [facility]. Aware that we will still need a urine specimen when available. Report given at bedside to [id number] [facility]. Pt alert and oriented.       Selene Redmond RN  12/05/18 7904

## 2018-12-06 NOTE — H&P
images are provided for review. Dose modulation, iterative reconstruction,   and/or weight based adjustment of the mA/kV was utilized to reduce the   radiation dose to as low as reasonably achievable.       COMPARISON:   None.       HISTORY:   ORDERING SYSTEM PROVIDED HISTORY: shortness of breath, mediastinum abnormal   on cxr   TECHNOLOGIST PROVIDED HISTORY:   Ordering Physician Provided Reason for Exam: abnormal chest xray. per   radiologist.   Acuity: Acute   Type of Exam: Initial       FINDINGS:   Pulmonary Arteries: Pulmonary arteries are adequately opacified for   evaluation.  No evidence of intraluminal filling defect to suggest pulmonary   embolism.  Main pulmonary artery is normal in caliber.       Mediastinum: There is evidence of mediastinal adenopathy.  Anterior   mediastinal nodes measure up to 2.5 cm.  Periaortic nodes measure up to 2.9   cm.  AP window nodes measure up to 3.4 cm.  Left hilar mass demonstrates   encasement of the left upper lobe and lingular bronchus and encases pulmonary   arteries and veins.  This mass measures approximately 4.9 x 3.4 cm.  The   heart and pericardium demonstrate no acute abnormality.  There is no acute   abnormality of the thoracic aorta.       Lungs/pleura: Severe emphysematous changes.  A right upper lobe spiculated   mass measures 3.7 x 3.9 cm.  The left hilar mass extending into the   mediastinum as described measures approximately 4.9 x 3.4 cm.  There is   encasement and narrowing of the left upper lobe and lingular bronchi with   postobstructive pneumonia.  There is no evidence of pleural effusion or   pneumothorax.       Upper Abdomen: A liver lesion can be seen in the left lobe measuring 2.9 x   2.3 cm.  No obvious adrenal masses.       Soft Tissues/Bones: No definitive destructive bony lesions.           Impression   1.  Evidence of extensive malignancy involving both lungs.  A spiculated right   upper lobe mass measures 3.9 x 3.7 cm.  A large left hilar

## 2018-12-06 NOTE — CONSULTS
INPATIENT PULMONARY CRITICAL CARE CONSULT NOTE      Chief Complaint/Referring Provider:  Patient is being seen at the request of Dr. She Marroquin  for a consultation for Lung mass      Presenting HPI: Patient admitted to the hospital with shortness of breath and fatigue    As per the admitting physician-58 y.o. male who presented to [facility] with above complaints. Patient reports that he is been sick for the past few weeks, mainly worsening shortness of breath and intermittent chest pain, accompanied by cough productive of whitish sputum with bloody streaks with occasional fever and chills. Patient reports a 20 pound and weight loss over the past few weeks. Patient presented with similar complaints back in end of October, was told he has probable lung cancer but he left AMA from the ED and did not seek any further medical care. He reports smoking for many years mostly 2 packs a day. He also reports worsening leg swelling and swelling in his scrotum.     Since the time of his admission patient's oxygen requirements have gone up and was on 15 L of high flow oxygen with patient seen, patient was having increased shortness of breath with increased work of breathing, patient says that he has been having cough with expectoration which is thick which is clear white or hemoptysis, patient also has generalized chest pain, patient has profound shortness of breath and wheezing, patient denies any epistaxis or gum bleeding, patient does not have any fever or chills, patient does not have any nausea or vomiting at this time, patient says that he does not have any hematochezia or melena, patient says that his shortness of breath has become progressively worse at least for last few weeks time, patient also says that his appetite has gone down and also patient has lost 20 pounds in last 1 month, patient has been complaining of some leg swelling, patient has been a chronic smoker, patient was alert and communicative but was not thoracic aorta.       Lungs/pleura: Severe emphysematous changes.  A right upper lobe spiculated   mass measures 3.7 x 3.9 cm.  The left hilar mass extending into the   mediastinum as described measures approximately 4.9 x 3.4 cm.  There is   encasement and narrowing of the left upper lobe and lingular bronchi with   postobstructive pneumonia.  There is no evidence of pleural effusion or   pneumothorax.       Upper Abdomen: A liver lesion can be seen in the left lobe measuring 2.9 x   2.3 cm.  No obvious adrenal masses.       Soft Tissues/Bones: No definitive destructive bony lesions.               Echocardiogram: Summary   Technically difficult examination secondary to lung cancer and COPD. All   views taken from subcostal position.   Left ventricular systolic function appears normal with ejection fraction   estimated at 55%.   No obvious regional wall motion abnormalities.   Mild tricuspid regurgitation.   Systolic pulmonary artery pressure (SPAP) is normal estimated at 39 mmHg   (Right atrial pressure of 3 mmHg). PFT: None        Assessment:  Active Problems:    Postobstructive pneumonia    Lung mass    Liver lesion    Tobacco abuse    Hypercalcemia    NSVT (nonsustained ventricular tachycardia) (HCC)    Mediastinal adenopathy    COPD exacerbation (HCC)    Cachexia (HCC)  Resolved Problems:    * No resolved hospital problems.  *          Plan:   Oxygen supplementation to keep saturation between 90-94%ONLY  Patient to be transferred to progressive care unit  Patient has potential for further decompensation and needs to monitor closely and if patient has worsening respiratory distress patient may have to be moved to ICU  Pulmonary toilet  Patient needs a bronchoscopy with endobronchial ultrasound-guided needle biopsy-patient is not stable to be done today and will aim for tomorrow morning  Patient to be given bronchodilators  IV steroids started  Patient appears to have postoperative pneumonia-we will change

## 2018-12-06 NOTE — CARE COORDINATION
CASE MANAGEMENT INITIAL ASSESSMENT      Reviewed chart and met with patient today, re: 58 yo male diagnosis  Explained Case Management role/services. Family present:  none  Primary contact information: Louis Vinson    Admit date/status: 12/6/18  Diagnosis: lung ca    Insurance: none  Precert required for SNF - Y, N        3 night stay required - Y, N    Living arrangements, Adls, care needs, prior to admission: lives in ranch style home 4 step entry    Transportation: private    Durable Medical Equipment at home: Walker__Cane__RTS__ BSC__Shower Chair__  02__ HHN__ CPAP__  BiPap__  Hospital Bed__ W/C___ Other__________    Services in the home and/or outpatient, prior to admission: none    PT/OT recs: none    Hospital Exemption Notification (HEN): needed for SNF    Barriers to discharge: no payor    Plan/comments: Patient plans on returning home at discharge. States he lives in ranch style home with his wife. Normally uses no DME and drives. Currently on 15L high flow nasal cannula O2. Has no payor source. Zohreh Boyle in finance to see if she can see him today. CM will follow for needs.  Joceline Snow RN      ECOC on chart for MD signature

## 2018-12-06 NOTE — ED NOTES
Pt aware that we still need a urine. Pt reports that he normally only urinates once a day and is unable to at this time. Dr Dawn Dark aware.  No new orders received     Juan Luis Tuttle RN  12/05/18 0785

## 2018-12-07 NOTE — PROGRESS NOTES
Hospitalist Progress Note      PCP: No primary care provider on file. Date of Admission: 12/6/2018    Chief Complaint: Fatigue, shortness of breath    Hospital Course: Reviewed H&P     Subjective: Patient having difficulty maintaining oxygen above 88% on 15L HF NC this AM .  Patient switched to nonrebreather and  Contacted pulmonology , who ordered for  vapotherm. - Initially planned for Bronchoscopy guided Lung Bx , which was cancelled given His Respiratory Status per Dr. Nora Banerjee .   - Reviewed Oncology And Rad Onc recs     Medications:  Reviewed    Infusion Medications    sodium chloride 125 mL/hr at 12/07/18 0522     Scheduled Medications    sodium chloride flush  10 mL Intravenous 2 times per day    enoxaparin  40 mg Subcutaneous Daily    guaiFENesin  600 mg Oral BID    ipratropium-albuterol  1 ampule Inhalation Q4H    nicotine  1 patch Transdermal Daily    budesonide  0.5 mg Nebulization BID    piperacillin-tazobactam  3.375 g Intravenous Q8H    methylPREDNISolone  80 mg Intravenous Q6H    pantoprazole  40 mg Intravenous Daily     PRN Meds: sodium chloride flush, magnesium hydroxide, ondansetron, magnesium sulfate, nitroGLYCERIN, acetaminophen, morphine      Intake/Output Summary (Last 24 hours) at 12/07/18 1243  Last data filed at 12/07/18 1240   Gross per 24 hour   Intake              636 ml   Output             2325 ml   Net            -1689 ml       Physical Exam Performed:  /73   Pulse 109   Temp 98 °F (36.7 °C) (Oral)   Resp 22   Ht 6' 1\" (1.854 m)   Wt 163 lb 12.8 oz (74.3 kg)   SpO2 92%   BMI 21.61 kg/m²     General appearance: In Respiratory  distress, appears stated age and cooperative. On Vapotherm   HEENT: Pupils equal, round, and reactive to light. Conjunctivae/corneas clear. Neck: Supple, with full range of motion. No jugular venous distention. Trachea midline. Respiratory: Increased respiratory effort. Diminshed Breath sounds bibasally   Cardiovascular: Regular 12/05/2018     Assessment/Plan:  Lung masses / Liver mass/mediastinal lymphadenopathy/ hypercalcemia   - suspicious for metastatic Lung cancer, squamous cell type due to hypercalcemia with metastatic lesion to liver. Significant and active smoking history. CT chest findings from 10/24/2018 as above. At that time patient deferred any treatment and left AMA from ED, but is now ready to receive treatment. - Consulted  pulmonology for bronch/ BAL/ EBUS biopsy and oncology consult     Acute Hypoxic respiratory failure  - Likely due to extensive bilateral lung cancer, COPD with postobstructive pneumonia  - IV Levaquin  - Acapella, duonebs Q4H, pulmicort  - O2 supplementation to keep sats > 92%  - pulm on  Consult and assiting with Mx . Awaiting Respiratory status to slightly improve before doing Bronchoscopy guided Bx      Liver lesion   - Suspicious for metastatic disease from primary lung CA  - Oncology on consult     Hypercalcemia Ca 12.1  - Likely due to metastatic lung cancer   - Treat with IV fluids and recheck Ca level in a.m.     NSVT (nonsustained ventricular tachycardia)  - 1 episode of NSVT noticed in the ED  -  Echo showed Normal LVEF ; No RWMA , mag level     Troponemia  - 0.05, EKG shows no acute ischemic changes, likely demand ischemia  - Trended  Troponin and reviewed  echo     Tobacco abuse  - Counseled cessation    DVT Prophylaxis: Lovenox  Diet: DIET CLEAR LIQUID;  Code Status: Full Code    PT/OT Eval Status: Will consult once acute issues resolve     Dispo - Anticipate patient to stay in the hospital over this weekend        The note was completed using EMR. Every effort was made to ensure accuracy; however, inadvertent computerized transcription errors may be present.     Darshan Simon MD

## 2018-12-07 NOTE — ONCOLOGY
ONCOLOGY HEMATOLOGY CARE PROGRESS NOTE      SUBJECTIVE:     Afebrile. On 15 LPM high flow NC.  HOB at 45 degrees. He thinks it would be hard to breath lying flat. ROS:   The remaining 10 point review of symptoms is unremarkable. OBJECTIVE        Physical    VITALS:  BP (!) 157/91   Pulse 105   Temp 98.6 °F (37 °C) (Oral)   Resp 20   Ht 6' 1\" (1.854 m)   Wt 163 lb 12.8 oz (74.3 kg)   SpO2 91%   BMI 21.61 kg/m²   TEMPERATURE:  Current - Temp: 98.6 °F (37 °C); Max - Temp  Av °F (36.7 °C)  Min: 97.7 °F (36.5 °C)  Max: 98.6 °F (37 °C)  PULSE OXIMETRY RANGE: SpO2  Av.7 %  Min: 88 %  Max: 95 %  24HR INTAKE/OUTPUT:    Intake/Output Summary (Last 24 hours) at 18 7375  Last data filed at 18 0542   Gross per 24 hour   Intake                0 ml   Output             2325 ml   Net            -2325 ml       CONSTITUTIONAL:  awake, alert, cooperative, no apparent distress, HEENT oral pharynx , no scleral icterus  HEMATOLOGIC/LYMPHATICS:  no cervical lymphadenopathy, no supraclavicular lymphadenopathy, no axillary lymphadenopathy and no inguinal lymphadenopathy  LUNGS:  No increased work of breathing, good air exchange, clear to auscultation bilaterally, no crackles or wheezing  CARDIOVASCULAR:  , regular rate and rhythm, normal S1 and S2, no S3 or S4, and no murmur noted  ABDOMEN:  No scars, normal bowel sounds, soft, non-distended, non-tender, no masses palpated, no hepatosplenomegally  MUSCULOSKELETAL:  There is no redness, warmth, or swelling of the joints. EXTREMETIES: No clubbing cynosis or edema  NEUROLOGIC:  Awake, alert, oriented to name, place and time. Cranial nerves II-XII are grossly intact. Motor is 5 out of 5 bilaterally.    SKIN:  no bruising or bleeding      Data      Recent Labs      18   1645   WBC  7.7   HGB  14.3   HCT  44.1   PLT  422   MCV  92.9        Recent Labs      18   1645  18   0518   NA  137  136   K  4.3  4.4

## 2018-12-07 NOTE — CONSULTS
x-ray HISTORY: ORDERING SYSTEM PROVIDED HISTORY: chest pain TECHNOLOGIST PROVIDED HISTORY: Reason for exam:->chest pain Ordering Physician Provided Reason for Exam: Fatigue (Wife states pt with increased generalized weakness and PO intake x approx 4 days. States diagnosed with lung CA in this ER in Oct anf has not had any f/u as of yet) Acuity: Acute Type of Exam: Initial FINDINGS: No acute bony abnormality. The visualized mediastinal contours are stable. Medial right upper lobe mass is unchanged in radiographic appearance since the prior study. There is volume loss in the left with elevation left hemidiaphragm. Elevation the hemidiaphragm was present to a lesser degree on the prior study. There is obscuration of the left heart border suggesting lingular airspace disease. Dense consolidation was present on the prior study as well. There new bandlike density within the right mid to lower lung No large right pleural effusion. Left mid and lower lung airspace disease, also present on the prior study, recurrent or persistent. Right mid to lower lung bandlike density, atelectasis and/or pneumonia. Medial right upper lobe mass, not changed since the prior study. ASSESSMENT:     Mr. Norma Kumar is a 59 y.o. male with imaging findings pathognomonic for metastatic cancer, likely lung in origin. He presented 1.5 months ago with similar symptoms with CT demonstrating a left hilar mass and a right upper lobe mass with a likely liver metastasis. He left AMA and did not seek care. He now presents with escalation of his symptoms with severe respiratory distress. He is also endorsing possible lower extremity weakness and altered sensation. Imaging workup and bronchoscopy are pending. PLAN:      1) Lung Cancer -  Current imaging consistent with Stage IV disease, no biopsy yet. Previous CT demonstrated left hilar based mass impinging on the left upper lobe and lingular bronchi with postobstructive change. The right upper lobe mass did not appear to be threatening obstruction. Current CXR demonstrates similar findings on the left when compared to a month ago, but new consolidation on the right. Staging imaging would be very helpful in determining his treatment plan and how much of his current respiratory failure is related to his malignancy. 2) Respiratory failure - Some of this is clearly secondary to his thoracic malignancy, however his current symptoms appear out of proportion to the degree of lung obstruction seen on prior CT. May be related to disease progression or multifactorial in nature with superimposed pneumonia. Radiation therapy may be indicated to help relieve any obstruction but repeat CT Chest would be helpful in determining this. Additionally, it will be difficult to treat him if he cannot lie flat. I discussed this at length with the patient who expressed he would be willing to proceed with radiation if it were recommended. Final recommendations are dependent on completing his imaging and bronchoscopy, although empiric radiation could be considered if he did not have other options. 3) Lower extremity weakness/numbness - Etiology unclear. Could be related to pain from possible bone metastases, cord compression, or deconditioning. Exam is difficult due to his current state. It is imperative that we obtain CT of his thoracic and lumbar spine, which would be included in the already ordered CT Chest/Abdomen/Pelvis. If appropriate workup cannot be obtained in his current state, would consider intubation to allow for proper assessment of the patient's condition. We will continue to follow in the patient's care. Thank you for the opportunity to see this patient and participate in his care. Edu Cortez M.D. Radiation Oncology    [facility]:  [address] [place] [address] 22, [facility] 19    Lachelle Urbano. [email or web address]  [phone]  Available

## 2018-12-08 NOTE — PROGRESS NOTES
bibasally   Cardiovascular: Regular rate and rhythm with normal S1/S2 without murmurs, rubs or gallops. Abdomen: Soft, non-tender, non-distended with normal bowel sounds. Musculoskeletal: No clubbing, cyanosis or edema bilaterally. Full range of motion without deformity. Skin: Skin color, texture, turgor normal.  No rashes or lesions. Neurologic:  Neurovascularly intact without any focal sensory/motor deficits. Cranial nerves: II-XII intact, grossly non-focal.  Psychiatric: Alert and oriented, thought content appropriate, normal insight  Capillary Refill: Brisk,< 3 seconds   Peripheral Pulses: +2 palpable, equal bilaterally       Labs:   Recent Labs      12/05/18   1645  12/07/18   1015   WBC  7.7  6.9   HGB  14.3  12.0*   HCT  44.1  36.2*   PLT  422  348     Recent Labs      12/05/18   1645  12/06/18   0518  12/07/18   1016   NA  137  136  134*   K  4.3  4.4  4.0   CL  93*  99  97*   CO2  26  27  21   BUN  35*  33*  33*   CREATININE  1.0  0.8  0.8   CALCIUM  12.1*  10.0  10.0     Recent Labs      12/05/18   1645   AST  38*   ALT  19   BILITOT  0.4   ALKPHOS  105     Recent Labs      12/05/18   1645   INR  1.04     Recent Labs      12/05/18   1645  12/06/18   0518  12/06/18   1123   TROPONINI  0.05*  0.03*  0.03*     Radiology:  XR ABDOMEN (KUB) (SINGLE AP VIEW)   Final Result   Nasogastric tube extends just below the diaphragm and could be advanced   approximately 4-6 cm. Endotracheal tube extends to the mid trachea. Persistent bibasilar airspace disease, left greater than right. Medial right   upper lobe mass. Please refer to recent chest CT. XR CHEST PORTABLE   Final Result   Nasogastric tube extends just below the diaphragm and could be advanced   approximately 4-6 cm. Endotracheal tube extends to the mid trachea. Persistent bibasilar airspace disease, left greater than right. Medial right   upper lobe mass.   Please refer to recent chest CT.         MRI BRAIN W WO CONTRAST (Results Pending)   CT ABDOMEN PELVIS W IV CONTRAST Additional Contrast? Oral    (Results Pending)   NM BONE SCAN WHOLE BODY    (Results Pending)   CT CHEST W CONTRAST    (Results Pending)     Active Hospital Problems    Diagnosis Date Noted    Mediastinal adenopathy [R59.0] 12/06/2018    COPD exacerbation ([address] [J44.1] 12/06/2018    Cachexia ([address] Nyasia Falling 12/06/2018    Acute respiratory failure with hypoxia ([address] [J96.01] 12/06/2018    Lung mass [R91.8] 12/05/2018    Liver lesion [K76.9] 12/05/2018    Tobacco abuse [Z72.0] 12/05/2018    Hypercalcemia [E83.52] 12/05/2018    NSVT (nonsustained ventricular tachycardia) ([address] [I47.2] 12/05/2018    Postobstructive pneumonia [J18.9] 12/05/2018     Assessment/Plan:  Lung masses / Liver mass/mediastinal lymphadenopathy/ hypercalcemia   - suspicious for metastatic Lung cancer, squamous cell type due to hypercalcemia with metastatic lesion to liver. Significant and active smoking history. CT chest findings from 10/24/2018 as above. At that time patient deferred any treatment and left AMA from ED, but is now ready to receive treatment. - Consulted  pulmonology for bronch/ BAL/ EBUS biopsy and oncology consult - pulmonology evaluated and initiated on Vapotherm given the difficulty in maintaining patient's oxygen saturation more than 88% on 15 L/m HF nasal cannula . - Patient transferred to ICU on 12/8/18 given impending respiratory failure for intubation and planned for Broch-  guided Biopsy       Acute Hypoxic respiratory failure   - Likely due to extensive bilateral lung cancer, COPD with postobstructive pneumonia  - IV Levaquin  - Acapella, duonebs Q4H, pulmicort  - O2 supplementation to keep sats > 92%  - pulm on   Consult and assiting with Mx .  He is Intubated on 12/8/18 given Worsening Respiratory status and subsequently had Bronchoscopy and Guided Bx      Liver lesion   - Suspicious for metastatic disease from primary lung CA  - Oncology following and

## 2018-12-08 NOTE — PLAN OF CARE
Problem: Risk for Impaired Skin Integrity  Goal: Tissue integrity - skin and mucous membranes  Structural intactness and normal physiological function of skin and  mucous membranes. Outcome: Ongoing      Problem: Falls - Risk of:  Goal: Will remain free from falls  Will remain free from falls   Outcome: Ongoing  Oriented to own ability and calls out approprietly.    Goal: Absence of physical injury  Absence of physical injury   Outcome: Ongoing      Problem: Pain:  Goal: Pain level will decrease  Pain level will decrease   Outcome: Ongoing

## 2018-12-08 NOTE — PROGRESS NOTES
Consent collected for bronchoscopy and central line placement. ETT placement per Dr. Benigno Porter.       Timeout performed for patient Josefina Jamison Ayaka/ 1960/ETT-OG-central line placement    Administered for sedation and comfort - etomidate 40 mg and versed 5 mg    ETT - 8.0 and 25 @ lip completed     Lillian Martins RN

## 2018-12-08 NOTE — PROGRESS NOTES
12/08/18 1552   Vent Information   Vent Type 840   Vent Mode AC/VC+   Vt Ordered 500 mL   Rate Set 16 bmp   Pressure Support 0 cmH20   FiO2  100 %   Sensitivity 3   PEEP/CPAP 15   I Time/ I Time % 0.9 s   Cuff Pressure (cm H2O) 28 cm H2O   Humidification Source Heated wire   Humidification Temp 36.9   Humidification Temp Measured 39.8   Vent Patient Data   Peak Inspiratory Pressure 32 cmH2O   Mean Airway Pressure 21 cmH20   Rate Measured 17 br/min   Vt Exhaled 642 mL   Minute Volume 10.6 Liters   I:E Ratio 1:3.20   Plateau Pressure 29 BDY26   Static Compliance 45.85 mL/cmH2O   Dynamic Compliance 37.76 mL/cmH2O   Cough/Sputum   Sputum How Obtained Endotracheal;Suctioned   Cough Non-productive   Sputum Amount None   Sputum Color None   Tenacity None   Spontaneous Breathing Trial (SBT) RT Doc   Pulse 93   SpO2 95 %   Breath Sounds   Right Upper Lobe Clear;Diminished   Right Middle Lobe Diminished   Right Lower Lobe Diminished   Left Upper Lobe Clear;Diminished   Left Lower Lobe Diminished   Additional Respiratory  Assessments   Resp 16   End Tidal CO2 30 (%)   Alarm Settings   High Pressure Alarm 45 cmH2O   Low Minute Volume Alarm 3 L/min   Apnea (secs) 20 secs   High Respiratory Rate 40 br/min   Low Exhaled Vt  300 mL   Patient Observation   Observations ambu @ bedside   ETT (adult)   Placement Date/Time: 12/08/18 0850   Preoxygenation: Yes  Mask Ventilation: Ventilated by mask (1)  Technique: Video laryngoscopy  Type: Cuffed  Tube Size: 8 mm  Laryngoscope: GlideScope  Blade Size: 4  Insertion attempts: 1  Placement Verified By[de-identified] C...    Secured at 25 cm   Measured From [address]  (moved to the left at this time.)   Secured By Commercial tube cates   Site Condition Cool;Dry

## 2018-12-08 NOTE — PROGRESS NOTES
chest CT dated 10/24/2018       HISTORY:   ORDERING SYSTEM PROVIDED HISTORY: ETT placement   TECHNOLOGIST PROVIDED HISTORY:   Reason for exam:->ETT placement; ORDERING SYSTEM PROVIDED HISTORY: OG   placement post intubation   TECHNOLOGIST PROVIDED HISTORY:   Reason for exam:->OG placement post intubation       FINDINGS:   Endotracheal tube extends to the inferior margin of the clavicles. Heterogeneous bibasilar pulmonary opacity is demonstrated, left greater than   right.  Convexity in the right paratracheal region along the medial right   upper lobe is similar to prior.  Cardiac and mediastinal silhouettes are   similar to prior.       Nasogastric tube is seen, with tip just below the level of the diaphragm. Moderate stool within the colon.  Paucity of small bowel gas is seen.  Pelvis   is not imaged.           Impression   Nasogastric tube extends just below the diaphragm and could be advanced   approximately 4-6 cm.       Endotracheal tube extends to the mid trachea.       Persistent bibasilar airspace disease, left greater than right.  Medial right   upper lobe mass.  Please refer to recent chest CT. ECHO- Summary   Technically difficult examination secondary to lung cancer and COPD. All   views taken from subcostal position.   Left ventricular systolic function appears normal with ejection fraction   estimated at 55%.   No obvious regional wall motion abnormalities.   Mild tricuspid regurgitation.   Systolic pulmonary artery pressure (SPAP) is normal estimated at 39 mmHg   (Right atrial pressure of 3 mmHg). Assessment:  Active Problems:    Postobstructive pneumonia    Lung mass    Liver lesion    Tobacco abuse    Hypercalcemia    NSVT (nonsustained ventricular tachycardia) (HCC)    Mediastinal adenopathy    COPD exacerbation (HCC)    Cachexia (HCC)    Acute respiratory failure with hypoxia (HCC)  Resolved Problems:    * No resolved hospital problems.  *          Plan:   Ventilatory support to

## 2018-12-09 NOTE — PROGRESS NOTES
Range: 0.8 - 1.3 mg/dL 1.0 0.8  0.8 1.0   Anion Gap Latest Ref Range: 3 - 16  18 (H) 10  16 12   GFR Non- Latest Ref Range: >60  >60 >60  >60 >60   GFR  Latest Ref Range: >60  >60 >60  >60 >60   Magnesium Latest Ref Range: 1.80 - 2.40 mg/dL  2.20      Lactic Acid Latest Ref Range: 0.4 - 2.0 mmol/L 2.0       Glucose Latest Ref Range: 70 - 99 mg/dL 120 (H) 106 (H)  124 (H) 143 (H)   POC Glucose Latest Ref Range: 70 - 99 mg/dl   94     Calcium Latest Ref Range: 8.3 - 10.6 mg/dL 12.1 (HH) 10.0  10.0 9.3   Total Protein Latest Ref Range: 6.4 - 8.2 g/dL 8.0         Results for Emelina Ballard (MRN [id number]) as of 12/9/2018 10:42   Ref. Range 10/24/2018 14:45 12/5/2018 16:45 12/7/2018 10:15 12/9/2018 06:44   WBC Latest Ref Range: 4.0 - 11.0 K/uL 8.7 7.7 6.9 7.8   RBC Latest Ref Range: 4.20 - 5.90 M/uL 4.19 (L) 4.75 3.93 (L) 3.96 (L)   Hemoglobin Quant Latest Ref Range: 13.5 - 17.5 g/dL 13.0 (L) 14.3 12.0 (L) 12.2 (L)   Hematocrit Latest Ref Range: 40.5 - 52.5 % 39.4 (L) 44.1 36.2 (L) 37.5 (L)   MCV Latest Ref Range: 80.0 - 100.0 fL 94.0 92.9 92.0 94.9   MCH Latest Ref Range: 26.0 - 34.0 pg 31.1 30.1 30.5 30.8   MCHC Latest Ref Range: 31.0 - 36.0 g/dL 33.1 32.4 33.2 32.4   MPV Latest Ref Range: 5.0 - 10.5 fL 7.4 7.6 8.0 8.1   RDW Latest Ref Range: 12.4 - 15.4 % 13.2 14.7 14.9 15.4   Platelet Count Latest Ref Range: 135 - 450 K/uL 430 422 348 285   Neutrophils % Latest Units: % 71.9 74.9 85.2 91.5   Lymphocyte % Latest Units: % 15.4 16.8 12.3 4.9   Monocytes % Latest Units: % 10.1 7.4 2.4 3.6       Results for Emelina Ballard (MRN [id number]) as of 12/9/2018 10:42   Ref. Range 12/6/2018 10:45 12/8/2018 13:30 12/9/2018 05:52   O2 Therapy Unknown See comment See comment Unknown   Hemoglobin, Art, Extended Latest Ref Range: 13.5 - 17.5 g/dL 12.4 (L) 12.4 (L) 12.6 (L)   pH, Arterial Latest Ref Range: 7.350 - 7.450  7. 351 7.314 (L) 7.233 (L)   pCO2, Arterial Latest Ref Range: 35.0 - 45.0

## 2018-12-09 NOTE — PROGRESS NOTES
Bronchoscopy Note    Patient with acute respiratory failure, lung mass, post obstructive pneumonia with COPD exacerbation who got intubated and was put on mechanical vent support, it was decided do a bronchoscopy for diagnostic and therapeutic purposes and for that reason after informed consent, the endoscopy team was requested to come to the bedside, patient was given tracheobronchial analgesia with lidocaine after timeout, patient was already on IV sedation to maintain patient ventilator synchrony and patient did not require any excess sedation for the procedure, the bronchoscopy was induced to the endotracheal tube using a T piece adapter and was found to have thick mucous plugs which were blocking the right mainstem bronchus had to be lavaged out using saline, patient had was found to have some thick mucous plugs in the right lower lobe bronchus which were lavaged out using saline and Mucomyst, patient did not have any endobronchial lesion on the right side, subsequently on increasing the rhonchus Jay Daniel left side patient was found to have thick mucous plugs in the left lower upper bronchus which are quite thickened viscous and tenacious and had to be lavaged out using Mucomyst and saline, patient was found to have complete blockage of the superior segment of the left lower lobe and there was also slight questionable endobronchial component near his previous segment of the left lower lobe, the BAL was done from the left lower lobe which was sent for cytology, bronchial brushings were done from the superior segment of the left lower lobe bronchus along with some bronchial biopsies, patient tolerated the procedure well and did not have any apparent complications  Patient's bronchoscopy findings were discussed with patient's family in detail  Further management depending on patient's clinical status and the bronchoscopy results    River Fraire MD

## 2018-12-09 NOTE — PROGRESS NOTES
12/09/18 0350   Vent Information   Vent Type 840   Vent Mode AC/VC+   Vt Ordered 500 mL   Rate Set 16 bmp   Pressure Support 0 cmH20   FiO2  70 %   Sensitivity 3   PEEP/CPAP 15   I Time/ I Time % 0.9 s   Humidification Temp 37   Vent Patient Data   Peak Inspiratory Pressure 30 cmH2O   Mean Airway Pressure 19 cmH20   Rate Measured 16 br/min   Vt Exhaled 624 mL   Minute Volume 9.86 Liters   I:E Ratio 1:3.20   Cough/Sputum   Sputum How Obtained Endotracheal   Cough Non-productive   Spontaneous Breathing Trial (SBT) RT Doc   Pulse 92   SpO2 95 %   Breath Sounds   Right Upper Lobe End Expiratory Wheezes   Right Middle Lobe End Expiratory Wheezes   Right Lower Lobe End Expiratory Wheezes   Left Upper Lobe End Expiratory Wheezes   Left Lower Lobe End Expiratory Wheezes   Additional Respiratory  Assessments   Resp 21   End Tidal CO2 31 (%)   Alarm Settings   High Pressure Alarm 45 cmH2O   Low Minute Volume Alarm 3 L/min   High Respiratory Rate 40 br/min   Low Exhaled Vt  200 mL   ETT (adult)   Placement Date/Time: 12/08/18 0850   Preoxygenation: Yes  Mask Ventilation: Ventilated by mask (1)  Technique: Video laryngoscopy  Type: Cuffed  Tube Size: 8 mm  Laryngoscope: GlideScope  Blade Size: 4  Insertion attempts: 1  Placement Verified By[de-identified] C...    Secured at 25 cm   Measured From Lips   ET Placement Right   Secured By Commercial tube cates   Site Condition Dry

## 2018-12-09 NOTE — PROGRESS NOTES
12/08/18 2115   Vent Information   Vent Type 840   Vent Mode AC/VC+   Vt Ordered 500 mL   Rate Set 16 bmp   Pressure Support 0 cmH20   FiO2  80 %   Sensitivity 3   PEEP/CPAP 15   I Time/ I Time % 0.9 s   Vent Patient Data   Peak Inspiratory Pressure 33 cmH2O   Mean Airway Pressure 20 cmH20   Rate Measured 18 br/min   Vt Exhaled 592 mL   Minute Volume 11 Liters   I:E Ratio 1:3.20   Spontaneous Breathing Trial (SBT) RT Doc   Pulse 91   SpO2 (!) 88 %   Additional Respiratory  Assessments   Resp 17   End Tidal CO2 28 (%)   Alarm Settings   High Pressure Alarm 45 cmH2O   Low Minute Volume Alarm 3 L/min   High Respiratory Rate 40 br/min

## 2018-12-09 NOTE — PROGRESS NOTES
Results for Nubia Stover (MRN [id number]) as of 12/9/2018 18:26   Ref.  Range 12/8/2018 13:30 12/9/2018 05:52 12/9/2018 17:00   pH, Arterial Latest Ref Range: 7.350 - 7.450  7.314 (L) 7.233 (L) 7.230 (L)   pCO2, Arterial Latest Ref Range: 35.0 - 45.0 mmHg 45.1 (H) 56.7 (H) 57.7 (H)   pO2, Arterial Latest Ref Range: 75.0 - 108.0 mmHg 68.2 (L) 144.0 (H) 68.1 (L)   HCO3, Arterial Latest Ref Range: 21.0 - 29.0 mmol/L 22.4 23.4 23.6       Sunday SHA Hoffman

## 2018-12-10 PROBLEM — E43 SEVERE MALNUTRITION (HCC): Status: ACTIVE | Noted: 2018-01-01

## 2018-12-10 NOTE — PROGRESS NOTES
(ml/day): 1 kcal/ml    Nutrition Diagnosis:   · Problem: Severe malnutrition  · Etiology: related to Insufficient energy/nutrient consumption, Impaired respiratory function-inability to consume food, Increased demand for energy/nutrients     Signs and symptoms:  as evidenced by Severe muscle loss, Severe loss of subcutaneous fat, Diet history of poor intake    Objective Information:  · Nutrition-Focused Physical Findings: No BM. Scheduled bowel regimen started today. · Wound Type: None  · Current Nutrition Therapies:  · Oral Diet Orders: NPO   · Oral Diet intake: Unable to assess  · Oral Nutrition Supplement (ONS) Orders: None  · ONS intake: Unable to assess  · Tube Feeding (TF) Orders:   · Feeding Route: Nasogastric  · Goal TF & Flush Orders Provides: Recommend Vital at 70 mL/hr x 20 hrs to provide 1400 mL TV, 1680 kcals, 105 gm protien, and 1135 mL free fluid. Consider free water flush of 100 mL  q 4 hrs if IVF discontinued. · Anthropometric Measures:  · Ht: 6' 1\" (185.4 cm)   · Current Body Wt: 169 lb (76.7 kg)  · Admission Body Wt: 163 lb 4 oz (74 kg)  · Weight Change: 20 lb weight loss in the past 3 months   · Ideal Body Wt: 184 lb (83.5 kg), % Ideal Body 10.9%  · BMI Classification: BMI 18.5 - 24.9 Normal Weight    Nutrition Interventions:   Start Tube Feeding  Continued Inpatient Monitoring    Nutrition Evaluation:   · Evaluation: Goals set   · Goals: Tolerate most appropriate nutrition therapy this admission to meet % of estimated needs   · Monitoring: TF Intake, TF Tolerance, Monitor Bowel Function, Weight, Pertinent Labs      Electronically signed by Dante Fulton.  Catalina Mendez RD, LD on 12/10/18 at 1:34 PM    Contact Number: [phone]

## 2018-12-10 NOTE — PLAN OF CARE
Problem: Nutrition  Goal: Optimal nutrition therapy  Outcome: Ongoing  Nutrition Problem: Severe malnutrition  Intervention: Food and/or Nutrient Delivery: Start Tube Feeding  Nutritional Goals:  Tolerate most appropriate nutrition therapy this admission to meet % of estimated needs

## 2018-12-10 NOTE — PROGRESS NOTES
Discussed with Dr. Izabela Vee from pathology that prelim report is not available from [address] done over the weekend. This will not be available until tomorrow the earliest. He will call me with results. Regardless, the patient has a VERY POOR PROGNOSIS given need for vent and profuse metastatic disease (liver and bone mets). ECOG status of 4. I will discuss with family this afternoon our very likely recommendation for comfort care/hospice. Discussed with Dr. Kane Shoemaker as well whom is in agreement. Palliative care is on board. Even though the patient is weaning off vent, his overall prognosis does not change. He has rapidly progressing disease from October, and cytotoxic chemo in a patient with this performance status would likely not cause any meaningful benefit. Chemo at this juncture would likely expedite his death from sepsis.

## 2018-12-10 NOTE — PROGRESS NOTES
12/09/18 2100   Vent Information   Vent Type 840   Vent Mode AC/VC+   Vt Ordered 500 mL   Rate Set 18 bmp   Pressure Support 0 cmH20   FiO2  60 %   Sensitivity 3   PEEP/CPAP 12   I Time/ I Time % 0 s   Cuff Pressure (cm H2O) 30 cm H2O   Humidification Source Heated wire   Humidification Temp 37   Humidification Temp Measured 36.6   Vent Patient Data   Peak Inspiratory Pressure 25 cmH2O   Mean Airway Pressure 16 cmH20   Rate Measured 17 br/min   Vt Exhaled 605 mL   Minute Volume 10.5 Liters   I:E Ratio 1:1.30   Plateau Pressure 20 DVC15   Static Compliance 86 mL/cmH2O   Dynamic Compliance 46.54 mL/cmH2O   Spontaneous Breathing Trial (SBT) RT Doc   Pulse 88   SpO2 99 %   Breath Sounds   Right Upper Lobe Diminished   Right Middle Lobe Diminished   Right Lower Lobe Diminished   Left Upper Lobe Diminished   Left Lower Lobe Diminished   Additional Respiratory  Assessments   Resp 18   End Tidal CO2 28 (%)   Alarm Settings   High Pressure Alarm 45 cmH2O   Low Minute Volume Alarm 3 L/min   Apnea (secs) 20 secs   High Respiratory Rate 40 br/min   ETT (adult)   Placement Date/Time: 12/08/18 0850   Preoxygenation: Yes  Mask Ventilation: Ventilated by mask (1)  Technique: Video laryngoscopy  Type: Cuffed  Tube Size: 8 mm  Laryngoscope: GlideScope  Blade Size: 4  Insertion attempts: 1  Placement Verified By[de-identified] C...    Secured at 25 cm   Measured From [address] [place] [address],[address] By Commercial tube cates   Site Condition Dry

## 2018-12-10 NOTE — CONSULTS
Planning  ___ Psychosocial/Spiritual Support  ___ Symptom Management                                                  Mindy Sylvester RN

## 2018-12-10 NOTE — PROGRESS NOTES
12/10/18 0007   Vent Information   Vent Type 840   Vent Mode AC/VC+   Vt Ordered 500 mL   Rate Set 18 bmp   Pressure Support 0 cmH20   FiO2  50 %   Sensitivity 3   PEEP/CPAP 12   I Time/ I Time % 0.9 s   Cuff Pressure (cm H2O) 26 cm H2O   Humidification Source Heated wire   Humidification Temp 36.2   Vent Patient Data   Peak Inspiratory Pressure 25 cmH2O   Mean Airway Pressure 16 cmH20   Rate Measured 18 br/min   Vt Exhaled 613 mL   Minute Volume 11 Liters   I:E Ratio 1:2.70   Cough/Sputum   Sputum How Obtained Endotracheal   Cough Productive   Sputum Amount Small   Sputum Color Clear   Tenacity Thin   Spontaneous Breathing Trial (SBT) RT Doc   Pulse 90   SpO2 98 %   Breath Sounds   Right Upper Lobe Diminished   Right Middle Lobe Fine Crackles   Right Lower Lobe Fine Crackles   Left Upper Lobe Diminished   Left Lower Lobe Fine Crackles   Additional Respiratory  Assessments   Resp 18   End Tidal CO2 29 (%)   Alarm Settings   High Pressure Alarm 45 cmH2O   Low Minute Volume Alarm 3 L/min   High Respiratory Rate 40 br/min   Low Exhaled Vt  200 mL   ETT (adult)   Placement Date/Time: 12/08/18 0850   Preoxygenation: Yes  Mask Ventilation: Ventilated by mask (1)  Technique: Video laryngoscopy  Type: Cuffed  Tube Size: 8 mm  Laryngoscope: GlideScope  Blade Size: 4  Insertion attempts: 1  Placement Verified By[de-identified] C...    Secured at 25 cm   Measured From Lips   ET Placement Right   Secured By Commercial tube cates   Site Condition Dry

## 2018-12-10 NOTE — PROGRESS NOTES
During assessment pt is intubated/sedated and appears comfortable; pt has been repositioned with day shift RN. No apparent concerns at this time. Pt continues to have fentanyl and propofol running. VSS.  BP 94/68   Pulse 89   Temp 99.1 °F (37.3 °C) (Core)   Resp 18   Ht 6' 1\" (1.854 m)   Wt 169 lb 8.5 oz (76.9 kg)   SpO2 100%   BMI 22.37 kg/m²

## 2018-12-11 NOTE — PROGRESS NOTES
intensivist, pharmacy, nursing, nutrition. All labs and imaging studies review, discussed with family. Prognosis remains extremely guarded.     Electronically signed by:  Beni Gagnon MD    12/10/2018    7:25 PM.

## 2018-12-11 NOTE — PROGRESS NOTES
Conjunctivae/corneas clear. Neck: Supple, with full range of motion. No jugular venous distention. Trachea midline. Respiratory: Clear Breath sounds B/L. Intubated. Cardiovascular: Regular rate and rhythm with normal S1/S2 without murmurs, rubs or gallops. Abdomen: Soft, non-tender, non-distended with normal bowel sounds. Musculoskeletal: No clubbing, cyanosis or edema bilaterally. Full range of motion without deformity. Skin: Skin color, texture, turgor normal.  No rashes or lesions. Neurologic: no apparent focal deficits  Psychiatric: sedated  Capillary Refill: Brisk,< 3 seconds   Peripheral Pulses: +2 palpable, equal bilaterally     Labs:   Recent Labs      12/09/18   0644  12/11/18   0705   WBC  7.8  8.2   HGB  12.2*  11.4*   HCT  37.5*  35.0*   PLT  285  244     Recent Labs      12/09/18   0644  12/11/18   0705  12/11/18   0845   NA  135*  142  139   K  4.9  4.4  4.6   CL  102  108  106   CO2  21  25  23   BUN  49*  53*  55*   CREATININE  1.0  0.8  0.8   CALCIUM  9.3  9.0  9.0       No results for input(s): CKTOTAL, TROPONINI in the last 72 hours. Radiology:  XR CHEST PORTABLE   Final Result   Complete collapse and consolidation of the left upper lobe due to a central   obstructing mass. Moderate to large mass in the medial right apex. Moderate   opacity in the mid and lower right lung suspected to be due to pneumonia. Moderate bullous changes. XR CHEST PORTABLE   Final Result   Limited evaluation by positioning. Recommend repeat imaging. Left lung opacities. CT ABDOMEN PELVIS WO CONTRAST Additional Contrast? None   Final Result   Right upper lobe mass is increased in size. There is progressive osseous   metastatic disease in the chest.  There is increased volume loss in the left   upper lobe with increased left lung consolidation. Left lung consolidation   blends imperceptibly with the soft tissue conglomerate in the left   paramediastinal region.       Hepatic metastatic

## 2018-12-12 NOTE — PROGRESS NOTES
Hospitalist Progress Note      PCP: No primary care provider on file. Date of Admission: 12/6/2018    Chief Complaint: Fatigue, shortness of breath    Hospital Course: 28-year-old  male presented to [facility] with complaints of fatigue, progressively worsening shortness of breath . He was diagnosed with probable lung cancer in late October but left the ED AMA saying that he could not afford treatment at that time. He reports smoking for many years mostly 2 packs per day. He got admitted to the hospital for further evaluation and management      Subjective:  No family present. Patient with tachypnea and tachycardia. He is unresponsive. Medications:  Reviewed    Infusion Medications     Scheduled Medications    scopolamine  1 patch Transdermal Q72H    mupirocin   Nasal BID    carboxymethylcellulose PF  1 drop Both Eyes TID    chlorhexidine  15 mL Mouth/Throat BID    sodium chloride flush  10 mL Intravenous 2 times per day     PRN Meds: morphine **OR** morphine, LORazepam **OR** LORazepam, bisacodyl, sodium chloride flush, magnesium hydroxide, ondansetron, magnesium sulfate, acetaminophen      Intake/Output Summary (Last 24 hours) at 12/12/18 1430  Last data filed at 12/12/18 0400   Gross per 24 hour   Intake              642 ml   Output             1595 ml   Net             -953 ml       Physical Exam Performed:  /87   Pulse 172   Temp 99 °F (37.2 °C) (Oral)   Resp (!) 33   Ht 6' 1\" (1.854 m)   Wt 169 lb 8.5 oz (76.9 kg)   SpO2 (!) 82%   BMI 22.37 kg/m²      General appearance:  Appears acutely ill   HEENT: Pupils equal, round, and reactive to light. Conjunctivae/corneas clear. Neck: Supple, with full range of motion. No jugular venous distention. Trachea midline. Respiratory:  Tachypnea, no wheezing or rales  Cardiovascular: Regular rate and rhythm with normal S1/S2 without murmurs, rubs or gallops.   Abdomen: Soft, non-tender, non-distended with normal bowel

## 2018-12-13 NOTE — PLAN OF CARE
Problem: Risk for Impaired Skin Integrity  Goal: Tissue integrity - skin and mucous membranes  Structural intactness and normal physiological function of skin and  mucous membranes.    Outcome: Ongoing      Problem: Falls - Risk of:  Goal: Will remain free from falls  Will remain free from falls   Outcome: Ongoing      Problem: Pain:  Goal: Control of acute pain  Control of acute pain   Outcome: Ongoing

## 2018-12-13 NOTE — FLOWSHEET NOTE
Bedside report given by Chris DALTON, Drips verified in EPIC and Skin assessment completed. Shift assessment completed and documented; see flowsheets for details and vent settings. Pt did not respond with verbal or physical stimulation, so I cut sedation in half and will continue to closely monitor. Pt resting in bed, VSS, Lines checked and verified, alarms on and audible. Repositioned patient, sacral Mepilex in place, call light within reach, will continue to closely monitor.
Pt remains unresponsive. Comfort care only. Ativan and morphine given per orders. Mouth care provided. Pt breathing through mouth-venturi mask with O2 applied for comfort. Wife at bedside. Bed alarm on and audible. Will monitor.
Regularity Regular   Heart Sounds S1, S2   Rhythm Interpretation   Pulse 95   Cardiac Monitor   Bedside Cardiac Monitor On Yes   Bedside Cardiac Audible Yes   Bedside Cardiac Alarms Set Yes   Telemetry Monitor On Yes   Telemetry Audible Yes   Telemetry Alarms Set Yes   Telemetry Box Number ICU bedside monitor   Bedside Monitor Alarm Parameters    Gastrointestinal   Abdominal (WDL) WDL   Abdomen Inspection Soft   RUQ Bowel Sounds Active   LUQ Bowel Sounds Active   RLQ Bowel Sounds Active   LLQ Bowel Sounds Active   Peripheral Vascular   Peripheral Vascular (WDL) X   Edema Left upper extremity;Right lower extremity; Left lower extremity;Right upper extremity   RUE Edema +1;Non-pitting   LUE Edema +2;Non-pitting   RLE Edema +1;Non-pitting   LLE Edema +1;Non-pitting   Skin Color/Condition   Skin Color/Condition (WDL) WDL   Skin Integrity   Skin Integrity (WDL) WDL   Musculoskeletal   Musculoskeletal (WDL) X   RUE Weakness   LUE Weakness   RL Extremity Weakness   LL Extremity Weakness   Genitourinary   Genitourinary (WDL) X  (calles)   Flank Tenderness No   Suprapubic Tenderness No   Dysuria KRISHNA   Urine Assessment   Incontinence (KRISHNA; Calles)   Anus/Rectum   Anus/Rectum (WDL) WDL   Urethral Catheter 16 fr   Placement Date/Time: 12/06/18 2300   Urethral Catheter Timeout: Patient  Tube Size (fr): 16 fr  Catheter Balloon Size: 10 mL  Urine Returned: Yes   Catheter Indications Need for fluid management in critically ill patients in a critical care setting not able to be managed by other means such as BSC with hat, bedpan, urinal, condom catheter, or short term intermittent urethral catherization   Site Assessment Pink; No urethral drainage   Urine Color Yellow   Urine Appearance Hazy   Psychosocial   Psychosocial (WDL) X  (intubated/sedated)

## 2018-12-13 NOTE — PROGRESS NOTES
RECOMMENDATIONS:   As part of a  process, the following is included for   reference:      Managing Abdominal Aortic Aneurysms      2.6-2.9 cm: Every 5 years*      3.0-3.4 cm: Every 3 years. 3.5-3.9 cm: Every 1 year. 4.0-4.4 cm: Every 1 year. Consider vascular consultation. *For abdominal aortas with maximum diameter of 2.6-2.9 cm meeting criteria   for AAA (>50% of proximal normal segment). Reference:      J Vasc Surg. 2009 Oct;50(4 Suppl):S2-49         CT CHEST WO CONTRAST   Final Result   Right upper lobe mass is increased in size. There is progressive osseous   metastatic disease in the chest.  There is increased volume loss in the left   upper lobe with increased left lung consolidation. Left lung consolidation   blends imperceptibly with the soft tissue conglomerate in the left   paramediastinal region. Hepatic metastatic disease, increased compared to prior. Osseous metastatic disease in the abdomen and pelvis,, involving L2 and right   superior pubic ramus      Mildly dilated abdominal aorta      RECOMMENDATIONS:   As part of a  process, the following is included for   reference:      Managing Abdominal Aortic Aneurysms      2.6-2.9 cm: Every 5 years*      3.0-3.4 cm: Every 3 years. 3.5-3.9 cm: Every 1 year. 4.0-4.4 cm: Every 1 year. Consider vascular consultation. *For abdominal aortas with maximum diameter of 2.6-2.9 cm meeting criteria   for AAA (>50% of proximal normal segment). Reference:      J Vasc Surg. 2009 Oct;50(4 Suppl):S2-49         XR ABDOMEN (KUB) (SINGLE AP VIEW)   Final Result   Nasogastric tube extends just below the diaphragm and could be advanced   approximately 4-6 cm. Endotracheal tube extends to the mid trachea. Persistent bibasilar airspace disease, left greater than right. Medial right   upper lobe mass. Please refer to recent chest CT.          XR CHEST PORTABLE   Final Result

## 2018-12-14 NOTE — PROGRESS NOTES
Required reporting for death within 7days of removal of 2 point soft wrist restraints completed-added to facility database.

## 2018-12-21 LAB
FINAL REPORT: NORMAL
PRELIMINARY: NORMAL

## 2019-01-14 LAB
FUNGUS (MYCOLOGY) CULTURE: ABNORMAL
FUNGUS STAIN: ABNORMAL
ORGANISM: ABNORMAL
ORGANISM: ABNORMAL

## 2019-01-22 LAB
AFB CULTURE (MYCOBACTERIA): NORMAL
AFB SMEAR: NORMAL

## (undated) DEVICE — SYRINGE MED 10ML SLIP TIP BLNT FILL AND LUERLOCK DISP

## (undated) DEVICE — CONMED SCOPE SAVER BITE BLOCK, 20X27 MM: Brand: SCOPE SAVER

## (undated) DEVICE — NEBULIZER AEROSOL TBNG 7 FT FOR ACUTE CARE NEBUTECH

## (undated) DEVICE — TRAP,MUCUS SPECIMEN, 80CC: Brand: MEDLINE

## (undated) DEVICE — CONMED DISPOSABLE MICROBIOLOGY BRUSH, Ø1 MM, 1.8 MM WORKING DIAMETER, 110 CM LENGTH: Brand: CONMED

## (undated) DEVICE — SYRINGE MED 50ML LUERSLIP TIP

## (undated) DEVICE — TUBING, SUCTION, 3/16" X 12', STRAIGHT: Brand: MEDLINE

## (undated) DEVICE — BOWL MED M 16OZ PLAS CAP GRAD

## (undated) DEVICE — FRAME EYEWR PROTCT ASST CLR DISP SAFEVIEW

## (undated) DEVICE — TUBING, SUCTION, 3/16" X 6', STRAIGHT: Brand: MEDLINE

## (undated) DEVICE — FORCEPS BRONCH BIOPSY

## (undated) DEVICE — SINGLE USE BIOPSY VALVE MAJ-210: Brand: SINGLE USE BIOPSY VALVE (STERILE)

## (undated) DEVICE — SINGLE USE SUCTION VALVE MAJ-209: Brand: SINGLE USE SUCTION VALVE (STERILE)